# Patient Record
Sex: FEMALE | Race: OTHER | Employment: UNEMPLOYED | URBAN - METROPOLITAN AREA
[De-identification: names, ages, dates, MRNs, and addresses within clinical notes are randomized per-mention and may not be internally consistent; named-entity substitution may affect disease eponyms.]

---

## 2024-03-12 ENCOUNTER — APPOINTMENT (EMERGENCY)
Dept: CT IMAGING | Facility: HOSPITAL | Age: 29
End: 2024-03-12

## 2024-03-12 ENCOUNTER — HOSPITAL ENCOUNTER (EMERGENCY)
Facility: HOSPITAL | Age: 29
Discharge: HOME | End: 2024-03-12
Attending: EMERGENCY MEDICINE | Admitting: EMERGENCY MEDICINE

## 2024-03-12 VITALS
SYSTOLIC BLOOD PRESSURE: 109 MMHG | TEMPERATURE: 98.5 F | RESPIRATION RATE: 18 BRPM | DIASTOLIC BLOOD PRESSURE: 65 MMHG | HEART RATE: 95 BPM | HEIGHT: 63 IN | WEIGHT: 127.87 LBS | BODY MASS INDEX: 22.66 KG/M2 | OXYGEN SATURATION: 100 %

## 2024-03-12 DIAGNOSIS — H70.019: Primary | ICD-10-CM

## 2024-03-12 LAB
ALBUMIN SERPL BCP-MCNC: 4.4 G/DL (ref 3.5–5)
ALP SERPL-CCNC: 57 U/L (ref 34–104)
ALT SERPL W P-5'-P-CCNC: 9 U/L (ref 7–52)
ANION GAP SERPL CALCULATED.3IONS-SCNC: 6 MMOL/L (ref 4–13)
AST SERPL W P-5'-P-CCNC: 13 U/L (ref 13–39)
BASOPHILS # BLD AUTO: 0.04 THOUSANDS/ÂΜL (ref 0–0.1)
BASOPHILS NFR BLD AUTO: 0 % (ref 0–1)
BILIRUB SERPL-MCNC: 0.98 MG/DL (ref 0.2–1)
BUN SERPL-MCNC: 7 MG/DL (ref 5–25)
CALCIUM SERPL-MCNC: 8.9 MG/DL (ref 8.4–10.2)
CHLORIDE SERPL-SCNC: 105 MMOL/L (ref 96–108)
CO2 SERPL-SCNC: 26 MMOL/L (ref 21–32)
CREAT SERPL-MCNC: 0.51 MG/DL (ref 0.6–1.3)
EOSINOPHIL # BLD AUTO: 0.31 THOUSAND/ÂΜL (ref 0–0.61)
EOSINOPHIL NFR BLD AUTO: 3 % (ref 0–6)
ERYTHROCYTE [DISTWIDTH] IN BLOOD BY AUTOMATED COUNT: 12.3 % (ref 11.6–15.1)
EXT PREGNANCY TEST URINE: NEGATIVE
EXT. CONTROL: NORMAL
GFR SERPL CREATININE-BSD FRML MDRD: 131 ML/MIN/1.73SQ M
GLUCOSE SERPL-MCNC: 99 MG/DL (ref 65–140)
HCT VFR BLD AUTO: 38.2 % (ref 34.8–46.1)
HGB BLD-MCNC: 12.9 G/DL (ref 11.5–15.4)
IMM GRANULOCYTES # BLD AUTO: 0.03 THOUSAND/UL (ref 0–0.2)
IMM GRANULOCYTES NFR BLD AUTO: 0 % (ref 0–2)
LYMPHOCYTES # BLD AUTO: 1.59 THOUSANDS/ÂΜL (ref 0.6–4.47)
LYMPHOCYTES NFR BLD AUTO: 15 % (ref 14–44)
MCH RBC QN AUTO: 31.8 PG (ref 26.8–34.3)
MCHC RBC AUTO-ENTMCNC: 33.8 G/DL (ref 31.4–37.4)
MCV RBC AUTO: 94 FL (ref 82–98)
MONOCYTES # BLD AUTO: 0.97 THOUSAND/ÂΜL (ref 0.17–1.22)
MONOCYTES NFR BLD AUTO: 9 % (ref 4–12)
NEUTROPHILS # BLD AUTO: 7.9 THOUSANDS/ÂΜL (ref 1.85–7.62)
NEUTS SEG NFR BLD AUTO: 73 % (ref 43–75)
NRBC BLD AUTO-RTO: 0 /100 WBCS
PLATELET # BLD AUTO: 289 THOUSANDS/UL (ref 149–390)
PMV BLD AUTO: 9.3 FL (ref 8.9–12.7)
POTASSIUM SERPL-SCNC: 3.7 MMOL/L (ref 3.5–5.3)
PROT SERPL-MCNC: 7.4 G/DL (ref 6.4–8.4)
RBC # BLD AUTO: 4.06 MILLION/UL (ref 3.81–5.12)
SODIUM SERPL-SCNC: 137 MMOL/L (ref 135–147)
WBC # BLD AUTO: 10.84 THOUSAND/UL (ref 4.31–10.16)

## 2024-03-12 PROCEDURE — 96365 THER/PROPH/DIAG IV INF INIT: CPT

## 2024-03-12 PROCEDURE — 80053 COMPREHEN METABOLIC PANEL: CPT

## 2024-03-12 PROCEDURE — 70491 CT SOFT TISSUE NECK W/DYE: CPT

## 2024-03-12 PROCEDURE — 81025 URINE PREGNANCY TEST: CPT

## 2024-03-12 PROCEDURE — 96375 TX/PRO/DX INJ NEW DRUG ADDON: CPT

## 2024-03-12 PROCEDURE — 85025 COMPLETE CBC W/AUTO DIFF WBC: CPT

## 2024-03-12 PROCEDURE — 99285 EMERGENCY DEPT VISIT HI MDM: CPT

## 2024-03-12 PROCEDURE — 99283 EMERGENCY DEPT VISIT LOW MDM: CPT

## 2024-03-12 PROCEDURE — 36415 COLL VENOUS BLD VENIPUNCTURE: CPT

## 2024-03-12 RX ORDER — AMOXICILLIN AND CLAVULANATE POTASSIUM 875; 125 MG/1; MG/1
1 TABLET, FILM COATED ORAL ONCE
Status: DISCONTINUED | OUTPATIENT
Start: 2024-03-12 | End: 2024-03-12

## 2024-03-12 RX ORDER — MORPHINE SULFATE 4 MG/ML
4 INJECTION, SOLUTION INTRAMUSCULAR; INTRAVENOUS ONCE
Status: COMPLETED | OUTPATIENT
Start: 2024-03-12 | End: 2024-03-12

## 2024-03-12 RX ORDER — DEXAMETHASONE SODIUM PHOSPHATE 10 MG/ML
10 INJECTION, SOLUTION INTRAMUSCULAR; INTRAVENOUS ONCE
Status: COMPLETED | OUTPATIENT
Start: 2024-03-12 | End: 2024-03-12

## 2024-03-12 RX ORDER — CEFTRIAXONE 1 G/50ML
1000 INJECTION, SOLUTION INTRAVENOUS ONCE
Status: COMPLETED | OUTPATIENT
Start: 2024-03-12 | End: 2024-03-12

## 2024-03-12 RX ORDER — KETOROLAC TROMETHAMINE 10 MG/1
10 TABLET, FILM COATED ORAL EVERY 6 HOURS PRN
Qty: 14 TABLET | Refills: 0 | Status: SHIPPED | OUTPATIENT
Start: 2024-03-12

## 2024-03-12 RX ORDER — AMOXICILLIN AND CLAVULANATE POTASSIUM 875; 125 MG/1; MG/1
1 TABLET, FILM COATED ORAL EVERY 12 HOURS
Qty: 20 TABLET | Refills: 0 | Status: SHIPPED | OUTPATIENT
Start: 2024-03-12 | End: 2024-03-22

## 2024-03-12 RX ADMIN — IOHEXOL 85 ML: 350 INJECTION, SOLUTION INTRAVENOUS at 10:42

## 2024-03-12 RX ADMIN — MORPHINE SULFATE 4 MG: 4 INJECTION INTRAVENOUS at 10:10

## 2024-03-12 RX ADMIN — DEXAMETHASONE SODIUM PHOSPHATE 10 MG: 10 INJECTION INTRAMUSCULAR; INTRAVENOUS at 10:10

## 2024-03-12 RX ADMIN — CEFTRIAXONE 1000 MG: 1 INJECTION, SOLUTION INTRAVENOUS at 11:34

## 2024-03-12 NOTE — Clinical Note
Abdon Su was seen and treated in our emergency department on 3/12/2024.    No restrictions            Diagnosis:     Abdon  is off the rest of the shift today, may return to work on return date.    She may return on this date: 03/15/2024         If you have any questions or concerns, please don't hesitate to call.      Gee Limon PA-C    ______________________________           _______________          _______________  Hospital Representative                              Date                                Time

## 2024-03-12 NOTE — ED PROVIDER NOTES
History  Chief Complaint   Patient presents with    Oral Swelling     L sided lower jaw swelling starting x2 days ago     28-year-old female presents today with concerns of oral swelling and dental pain.  Patient states she is having difficulty opening her mouth which prompted her coming to the ER today.  No nausea or vomiting.  Patient able to handle secretions but able to open her mouth more than just a few centimeters.  Denies any fever or chills.  No abdominal pain.  No chest pain or shortness of breath.  Denies any ear pain patent.  Patient states that when she chews it hurts more.  States that any touching of the area hurts a lot more.  Has tried naproxen without any relief.        None       History reviewed. No pertinent past medical history.    History reviewed. No pertinent surgical history.    History reviewed. No pertinent family history.  I have reviewed and agree with the history as documented.    E-Cigarette/Vaping    E-Cigarette Use Never User      E-Cigarette/Vaping Substances     Social History     Tobacco Use    Smoking status: Never    Smokeless tobacco: Never   Vaping Use    Vaping status: Never Used   Substance Use Topics    Alcohol use: Never    Drug use: Never       Review of Systems   Constitutional:  Negative for chills and fever.   HENT:  Positive for dental problem and facial swelling. Negative for drooling, ear discharge, ear pain, sore throat, trouble swallowing and voice change.    Eyes:  Negative for pain and visual disturbance.   Respiratory:  Negative for cough and shortness of breath.    Cardiovascular:  Negative for chest pain and palpitations.   Gastrointestinal:  Negative for abdominal pain, diarrhea, nausea and vomiting.   Genitourinary:  Negative for dysuria and hematuria.   Musculoskeletal:  Negative for arthralgias and back pain.   Skin:  Negative for color change and rash.   Neurological:  Negative for seizures and syncope.   All other systems reviewed and are  negative.      Physical Exam  Physical Exam  Vitals and nursing note reviewed.   Constitutional:       General: She is not in acute distress.     Appearance: She is well-developed.   HENT:      Head: Normocephalic and atraumatic.      Mouth/Throat:      Mouth: Mucous membranes are moist.      Pharynx: Posterior oropharyngeal erythema (Mild, left-sided.  No obvious dental caries.  No discharge in the mouth.) present. No oropharyngeal exudate.      Comments: Tenderness to the left jaw.  Elevation of the tongue.  Trismus.  Handling secretions at this time.  Eyes:      Conjunctiva/sclera: Conjunctivae normal.   Cardiovascular:      Rate and Rhythm: Normal rate and regular rhythm.      Pulses: Normal pulses.      Heart sounds: Normal heart sounds. No murmur heard.     No friction rub. No gallop.   Pulmonary:      Effort: Pulmonary effort is normal. No respiratory distress.      Breath sounds: Normal breath sounds.   Abdominal:      Palpations: Abdomen is soft.      Tenderness: There is no abdominal tenderness.   Musculoskeletal:         General: No swelling.      Cervical back: Neck supple. Tenderness present. No rigidity.   Lymphadenopathy:      Cervical: Cervical adenopathy (Mild, tender.) present.   Skin:     General: Skin is warm and dry.      Capillary Refill: Capillary refill takes less than 2 seconds.   Neurological:      Mental Status: She is alert.   Psychiatric:         Mood and Affect: Mood normal.         Vital Signs  ED Triage Vitals   Temperature Pulse Respirations Blood Pressure SpO2   03/12/24 0950 03/12/24 0950 03/12/24 0950 03/12/24 0950 03/12/24 0950   98.5 °F (36.9 °C) 95 18 109/65 100 %      Temp Source Heart Rate Source Patient Position - Orthostatic VS BP Location FiO2 (%)   03/12/24 0950 03/12/24 0950 03/12/24 0950 03/12/24 0950 --   Oral Monitor Sitting Left arm       Pain Score       03/12/24 0948       8           Vitals:    03/12/24 0950   BP: 109/65   Pulse: 95   Patient Position -  Orthostatic VS: Sitting         Visual Acuity      ED Medications  Medications   dexamethasone (PF) (DECADRON) injection 10 mg (10 mg Intravenous Given 3/12/24 1010)   morphine injection 4 mg (4 mg Intravenous Given 3/12/24 1010)   iohexol (OMNIPAQUE) 350 MG/ML injection (SINGLE-DOSE) 85 mL (85 mL Intravenous Given 3/12/24 1042)   cefTRIAXone (ROCEPHIN) IVPB (premix in dextrose) 1,000 mg 50 mL (0 mg Intravenous Stopped 3/12/24 1208)       Diagnostic Studies  Results Reviewed       Procedure Component Value Units Date/Time    Comprehensive metabolic panel [[id number]]  (Abnormal) Collected: 03/12/24 1005    Lab Status: Final result Specimen: Blood from Arm, Right Updated: 03/12/24 1029     Sodium 137 mmol/L      Potassium 3.7 mmol/L      Chloride 105 mmol/L      CO2 26 mmol/L      ANION GAP 6 mmol/L      BUN 7 mg/dL      Creatinine 0.51 mg/dL      Glucose 99 mg/dL      Calcium 8.9 mg/dL      AST 13 U/L      ALT 9 U/L      Alkaline Phosphatase 57 U/L      Total Protein 7.4 g/dL      Albumin 4.4 g/dL      Total Bilirubin 0.98 mg/dL      eGFR 131 ml/min/1.73sq m     Narrative:      National Kid[employer] Foundation guidelines for Chronic Kidney Disease (CKD):     Stage 1 with normal or high GFR (GFR > 90 mL/min/1.73 square meters)    Stage 2 Mild CKD (GFR = 60-89 mL/min/1.73 square meters)    Stage 3A Moderate CKD (GFR = 45-59 mL/min/1.73 square meters)    Stage 3B Moderate CKD (GFR = 30-44 mL/min/1.73 square meters)    Stage 4 Severe CKD (GFR = 15-29 mL/min/1.73 square meters)    Stage 5 End Stage CKD (GFR <15 mL/min/1.73 square meters)  Note: GFR calculation is accurate only with a steady state creatinine    CBC and differential [[id number]]  (Abnormal) Collected: 03/12/24 1005    Lab Status: Final result Specimen: Blood from Arm, Right Updated: 03/12/24 1010     WBC 10.84 Thousand/uL      RBC 4.06 Million/uL      Hemoglobin 12.9 g/dL      Hematocrit 38.2 %      MCV 94 fL      MCH 31.8 pg      MCHC 33.8 g/dL      RDW  12.3 %      MPV 9.3 fL      Platelets 289 Thousands/uL      nRBC 0 /100 WBCs      Neutrophils Relative 73 %      Immature Grans % 0 %      Lymphocytes Relative 15 %      Monocytes Relative 9 %      Eosinophils Relative 3 %      Basophils Relative 0 %      Neutrophils Absolute 7.90 Thousands/µL      Absolute Immature Grans 0.03 Thousand/uL      Absolute Lymphocytes 1.59 Thousands/µL      Absolute Monocytes 0.97 Thousand/µL      Eosinophils Absolute 0.31 Thousand/µL      Basophils Absolute 0.04 Thousands/µL     POCT pregnancy, urine [[id number]]  (Normal) Resulted: 03/12/24 1009    Lab Status: Final result Updated: 03/12/24 1009     EXT Preg Test, Ur Negative     Control Valid                   CT soft tissue neck with contrast   Final Result by Yasir Gallardo MD (03/12 1055)      Diffuse inflammatory/edematous change in left perimandibular region, left sublingual space, left submandibular space, inferior extension into left lower anterolateral neck, and submucosal edema and left oropharynx with effacement of left vallecula    suspicious for acute cellulitis in deep neck spaces with reactive sialadenitis of left submandibular gland. 0.7 cm subperiosteal abscess along the inner cortex of left angle of mandible. Odontogenic source of infection is likely impacted mandibular last    molar tooth given periapical lucency. Recommend dental consultation for further evaluation.      Mildly prominent subcentimeter left cervical lymph nodes, likely reactive.      C1-C2 posterior spinal fixation hardware. No acute hardware complication.      The study was marked in EPIC for immediate notification.                           Workstation performed: [id number]                    Procedures  Procedures         ED Course  ED Course as of 03/12/24 1357   Tue Mar 12, 2024   1010 WBC(!): 10.84   1011 PREGNANCY TEST URINE: Negative                            Initial Sepsis Screening       Row Name 03/12/24 1033                Is the  patient's history suggestive of a new or worsening infection? Yes (Proceed)  -ST        Suspected source of infection soft tissue  -ST        Indicate SIRS criteria Tachycardia > 90 bpm  -ST        Are two or more of the above signs & symptoms of infection both present and new to the patient? No  -ST                  User Key  (r) = Recorded By, (t) = Taken By, (c) = Cosigned By      Initials Name Provider Type    ST Gee Limon PA-C Physician Assistant                    SBIRT 20yo+      Flowsheet Row Most Recent Value   Initial Alcohol Screen: US AUDIT-C     1. How often do you have a drink containing alcohol? 0 Filed at: 03/12/2024 1044   2. How many drinks containing alcohol do you have on a typical day you are drinking?  0 Filed at: 03/12/2024 1044   3b. FEMALE Any Age, or MALE 65+: How often do you have 4 or more drinks on one occassion? 0 Filed at: 03/12/2024 1044   Audit-C Score 0 Filed at: 03/12/2024 1044   MISSY: How many times in the past year have you...    Used an illegal drug or used a prescription medication for non-medical reasons? Never Filed at: 03/12/2024 1044                      Medical Decision Making  20-year-old female presents today with concerns of pain when opening mouth, jaw pain, tooth pain.  No nausea or vomiting.  Trismus on exam, tenderness to palpation along the left jaw and left neck.  CT soft tissue neck to rule out Troy's angina vs deep abscess. Patient handling secretions this time.  CBC, CMP, POCT pregnancy.  Decadron for swelling.  Lab workup with mild leukocytosis, otherwise reassuring.  Patient feeling better after medications.  CT consistent with subcentimeter abscess, left subperiosteal with diffuse cellulitis and edema.  Reached out to [facility], will be able to see in the office soon for possible drainage. Per [facility], will give dose of IV abx while in the ER and dishcarge on augmentin and toradol for infection and pain/swelling, respectively. Referral  "sent.  ------------------------------------------------------------  Strict return precautions discussed. Patient at time of discharge well-appearing in no acute distress, all questions answered. Patient agreeable to plan.  Patient's vitals, lab/imaging results, diagnosis, and treatment plan were discussed with the patient. All new/changed medications were discussed with patient, specifically, route of administration, how often and when to take, and where they can be picked up. Strict return precautions as well as close follow up with PCP was discussed with the patient and the patient was agreeable to my recommendations.  Patient verbally acknowledged understanding of the above communications. All labs reviewed and utilized in the medical decision making process (if labs were ordered). Portions of the record may have been created with voice recognition software.  Occasional wrong word or \"sound a like\" substitutions may have occurred due to the inherent limitations of voice recognition software.  Read the chart carefully and recognize, using context, where substitutions have occurred.      Amount and/or Complexity of Data Reviewed  Labs: ordered. Decision-making details documented in ED Course.  Radiology: ordered.    Risk  Prescription drug management.             Disposition  Final diagnoses:   Subperiosteal abscess of mastoid     Time reflects when diagnosis was documented in both MDM as applicable and the Disposition within this note       Time User Action Codes Description Comment    3/12/2024 11:07 AM Gee Limon Add [H70.019] Subperiosteal abscess of mastoid           ED Disposition       ED Disposition   Discharge    Condition   Stable    Date/Time   Tue Mar 12, 2024 1106    Comment   Abdon Su discharge to home/self care.                   Follow-up Information       Follow up With Specialties Details Why Contact Info Additional Information    [facility] Emergency Department " Emergency Medicine Go to  If symptoms worsen [address] [address]  [phone] [facility], [address] [address]    Kamla Chavez DMD Oral Maxillofacial Surgery Schedule an appointment as soon as possible for a visit   [address] [place] [address] [id number] [phone]               Discharge Medication List as of 3/12/2024 11:36 AM        START taking these medications    Details   amoxicillin-clavulanate (AUGMENTIN) 875-125 mg per tablet Take 1 tablet by mouth every 12 (twelve) hours for 10 days, Starting Tue 3/12/2024, Until Fri 3/22/2024, Normal      ketorolac (TORADOL) 10 mg tablet Take 1 tablet (10 mg total) by mouth every 6 (six) hours as needed for moderate pain, Starting Tue 3/12/2024, Normal                 PDMP Review       None            ED Provider  Electronically Signed by             Gee Limon PA-C  03/12/24 8876

## 2024-05-12 ENCOUNTER — APPOINTMENT (EMERGENCY)
Dept: CT IMAGING | Facility: HOSPITAL | Age: 29
End: 2024-05-12

## 2024-05-12 ENCOUNTER — HOSPITAL ENCOUNTER (EMERGENCY)
Facility: HOSPITAL | Age: 29
Discharge: STILL ADMITTED OR ADMINISTRATIVE | End: 2024-05-12
Attending: INTERNAL MEDICINE

## 2024-05-12 ENCOUNTER — HOSPITAL ENCOUNTER (INPATIENT)
Facility: HOSPITAL | Age: 29
LOS: 4 days | Discharge: HOME | End: 2024-05-16
Attending: INTERNAL MEDICINE | Admitting: INTERNAL MEDICINE
Payer: COMMERCIAL

## 2024-05-12 VITALS
SYSTOLIC BLOOD PRESSURE: 99 MMHG | TEMPERATURE: 98.3 F | RESPIRATION RATE: 16 BRPM | OXYGEN SATURATION: 99 % | DIASTOLIC BLOOD PRESSURE: 57 MMHG | HEART RATE: 82 BPM

## 2024-05-12 DIAGNOSIS — K04.7 ACUTE PERIAPICAL ABSCESS: Primary | ICD-10-CM

## 2024-05-12 DIAGNOSIS — K04.7 DENTAL ABSCESS: Primary | ICD-10-CM

## 2024-05-12 DIAGNOSIS — M27.2 MANDIBULAR ABSCESS: ICD-10-CM

## 2024-05-12 LAB
ANION GAP SERPL CALCULATED.3IONS-SCNC: 6 MMOL/L (ref 4–13)
BASOPHILS # BLD AUTO: 0.05 THOUSANDS/ÂΜL (ref 0–0.1)
BASOPHILS NFR BLD AUTO: 1 % (ref 0–1)
BUN SERPL-MCNC: 11 MG/DL (ref 5–25)
CALCIUM SERPL-MCNC: 8.7 MG/DL (ref 8.4–10.2)
CHLORIDE SERPL-SCNC: 104 MMOL/L (ref 96–108)
CO2 SERPL-SCNC: 26 MMOL/L (ref 21–32)
CREAT SERPL-MCNC: 0.5 MG/DL (ref 0.6–1.3)
EOSINOPHIL # BLD AUTO: 0.44 THOUSAND/ÂΜL (ref 0–0.61)
EOSINOPHIL NFR BLD AUTO: 4 % (ref 0–6)
ERYTHROCYTE [DISTWIDTH] IN BLOOD BY AUTOMATED COUNT: 12.2 % (ref 11.6–15.1)
EXT PREGNANCY TEST URINE: NEGATIVE
EXT. CONTROL: NORMAL
GFR SERPL CREATININE-BSD FRML MDRD: 132 ML/MIN/1.73SQ M
GLUCOSE SERPL-MCNC: 87 MG/DL (ref 65–140)
HCT VFR BLD AUTO: 34 % (ref 34.8–46.1)
HGB BLD-MCNC: 11.7 G/DL (ref 11.5–15.4)
IMM GRANULOCYTES # BLD AUTO: 0.03 THOUSAND/UL (ref 0–0.2)
IMM GRANULOCYTES NFR BLD AUTO: 0 % (ref 0–2)
LYMPHOCYTES # BLD AUTO: 1.87 THOUSANDS/ÂΜL (ref 0.6–4.47)
LYMPHOCYTES NFR BLD AUTO: 17 % (ref 14–44)
MCH RBC QN AUTO: 32.7 PG (ref 26.8–34.3)
MCHC RBC AUTO-ENTMCNC: 34.4 G/DL (ref 31.4–37.4)
MCV RBC AUTO: 95 FL (ref 82–98)
MONOCYTES # BLD AUTO: 0.87 THOUSAND/ÂΜL (ref 0.17–1.22)
MONOCYTES NFR BLD AUTO: 8 % (ref 4–12)
NEUTROPHILS # BLD AUTO: 7.52 THOUSANDS/ÂΜL (ref 1.85–7.62)
NEUTS SEG NFR BLD AUTO: 70 % (ref 43–75)
NRBC BLD AUTO-RTO: 0 /100 WBCS
PLATELET # BLD AUTO: 312 THOUSANDS/UL (ref 149–390)
PMV BLD AUTO: 9.6 FL (ref 8.9–12.7)
POTASSIUM SERPL-SCNC: 3.9 MMOL/L (ref 3.5–5.3)
RBC # BLD AUTO: 3.58 MILLION/UL (ref 3.81–5.12)
SODIUM SERPL-SCNC: 136 MMOL/L (ref 135–147)
WBC # BLD AUTO: 10.78 THOUSAND/UL (ref 4.31–10.16)

## 2024-05-12 PROCEDURE — 36415 COLL VENOUS BLD VENIPUNCTURE: CPT | Performed by: INTERNAL MEDICINE

## 2024-05-12 PROCEDURE — 81025 URINE PREGNANCY TEST: CPT | Performed by: INTERNAL MEDICINE

## 2024-05-12 PROCEDURE — 96375 TX/PRO/DX INJ NEW DRUG ADDON: CPT

## 2024-05-12 PROCEDURE — 99223 1ST HOSP IP/OBS HIGH 75: CPT | Performed by: INTERNAL MEDICINE

## 2024-05-12 PROCEDURE — 85025 COMPLETE CBC W/AUTO DIFF WBC: CPT | Performed by: INTERNAL MEDICINE

## 2024-05-12 PROCEDURE — 96361 HYDRATE IV INFUSION ADD-ON: CPT

## 2024-05-12 PROCEDURE — 70491 CT SOFT TISSUE NECK W/DYE: CPT

## 2024-05-12 PROCEDURE — 80048 BASIC METABOLIC PNL TOTAL CA: CPT | Performed by: INTERNAL MEDICINE

## 2024-05-12 PROCEDURE — 99285 EMERGENCY DEPT VISIT HI MDM: CPT | Performed by: INTERNAL MEDICINE

## 2024-05-12 PROCEDURE — 96376 TX/PRO/DX INJ SAME DRUG ADON: CPT

## 2024-05-12 PROCEDURE — 96365 THER/PROPH/DIAG IV INF INIT: CPT

## 2024-05-12 PROCEDURE — 99284 EMERGENCY DEPT VISIT MOD MDM: CPT

## 2024-05-12 RX ORDER — KETOROLAC TROMETHAMINE 30 MG/ML
15 INJECTION, SOLUTION INTRAMUSCULAR; INTRAVENOUS ONCE
Status: COMPLETED | OUTPATIENT
Start: 2024-05-12 | End: 2024-05-12

## 2024-05-12 RX ORDER — ACETAMINOPHEN 10 MG/ML
1000 INJECTION, SOLUTION INTRAVENOUS ONCE
Status: COMPLETED | OUTPATIENT
Start: 2024-05-12 | End: 2024-05-12

## 2024-05-12 RX ORDER — NAPROXEN 500 MG/1
500 TABLET ORAL 2 TIMES DAILY WITH MEALS
COMMUNITY
End: 2024-05-16

## 2024-05-12 RX ORDER — HYDROMORPHONE HCL IN WATER/PF 6 MG/30 ML
0.2 PATIENT CONTROLLED ANALGESIA SYRINGE INTRAVENOUS EVERY 2 HOUR PRN
Status: DISCONTINUED | OUTPATIENT
Start: 2024-05-12 | End: 2024-05-13

## 2024-05-12 RX ORDER — ACETAMINOPHEN 325 MG/1
975 TABLET ORAL EVERY 6 HOURS PRN
Status: DISCONTINUED | OUTPATIENT
Start: 2024-05-12 | End: 2024-05-12

## 2024-05-12 RX ORDER — MORPHINE SULFATE 4 MG/ML
4 INJECTION, SOLUTION INTRAMUSCULAR; INTRAVENOUS ONCE
Status: COMPLETED | OUTPATIENT
Start: 2024-05-12 | End: 2024-05-12

## 2024-05-12 RX ORDER — ACETAMINOPHEN 10 MG/ML
1000 INJECTION, SOLUTION INTRAVENOUS EVERY 6 HOURS PRN
Status: DISCONTINUED | OUTPATIENT
Start: 2024-05-12 | End: 2024-05-13

## 2024-05-12 RX ORDER — OXYCODONE HYDROCHLORIDE 5 MG/1
5 TABLET ORAL EVERY 4 HOURS PRN
Status: DISCONTINUED | OUTPATIENT
Start: 2024-05-12 | End: 2024-05-12

## 2024-05-12 RX ORDER — SODIUM CHLORIDE, SODIUM GLUCONATE, SODIUM ACETATE, POTASSIUM CHLORIDE, MAGNESIUM CHLORIDE, SODIUM PHOSPHATE, DIBASIC, AND POTASSIUM PHOSPHATE .53; .5; .37; .037; .03; .012; .00082 G/100ML; G/100ML; G/100ML; G/100ML; G/100ML; G/100ML; G/100ML
100 INJECTION, SOLUTION INTRAVENOUS CONTINUOUS
Status: DISCONTINUED | OUTPATIENT
Start: 2024-05-12 | End: 2024-05-16 | Stop reason: HOSPADM

## 2024-05-12 RX ORDER — ONDANSETRON 2 MG/ML
4 INJECTION INTRAMUSCULAR; INTRAVENOUS EVERY 6 HOURS PRN
Status: DISCONTINUED | OUTPATIENT
Start: 2024-05-12 | End: 2024-05-12

## 2024-05-12 RX ORDER — SENNOSIDES 8.6 MG
1 TABLET ORAL DAILY
Status: DISCONTINUED | OUTPATIENT
Start: 2024-05-13 | End: 2024-05-16 | Stop reason: HOSPADM

## 2024-05-12 RX ORDER — ONDANSETRON 2 MG/ML
4 INJECTION INTRAMUSCULAR; INTRAVENOUS EVERY 6 HOURS PRN
Status: DISCONTINUED | OUTPATIENT
Start: 2024-05-12 | End: 2024-05-16 | Stop reason: HOSPADM

## 2024-05-12 RX ADMIN — AMPICILLIN SODIUM AND SULBACTAM SODIUM 3 G: 2; 1 INJECTION, POWDER, FOR SOLUTION INTRAMUSCULAR; INTRAVENOUS at 12:20

## 2024-05-12 RX ADMIN — HYDROMORPHONE HYDROCHLORIDE 0.2 MG: 0.2 INJECTION, SOLUTION INTRAMUSCULAR; INTRAVENOUS; SUBCUTANEOUS at 19:38

## 2024-05-12 RX ADMIN — ONDANSETRON 4 MG: 2 INJECTION INTRAMUSCULAR; INTRAVENOUS at 20:14

## 2024-05-12 RX ADMIN — IOHEXOL 100 ML: 350 INJECTION, SOLUTION INTRAVENOUS at 10:57

## 2024-05-12 RX ADMIN — KETOROLAC TROMETHAMINE 15 MG: 30 INJECTION, SOLUTION INTRAMUSCULAR at 10:21

## 2024-05-12 RX ADMIN — MORPHINE SULFATE 4 MG: 4 INJECTION INTRAVENOUS at 15:38

## 2024-05-12 RX ADMIN — SODIUM CHLORIDE 3 G: 9 INJECTION, SOLUTION INTRAVENOUS at 23:52

## 2024-05-12 RX ADMIN — KETOROLAC TROMETHAMINE 15 MG: 30 INJECTION, SOLUTION INTRAMUSCULAR at 11:31

## 2024-05-12 RX ADMIN — SODIUM CHLORIDE, SODIUM GLUCONATE, SODIUM ACETATE, POTASSIUM CHLORIDE, MAGNESIUM CHLORIDE, SODIUM PHOSPHATE, DIBASIC, AND POTASSIUM PHOSPHATE 100 ML/HR: .53; .5; .37; .037; .03; .012; .00082 INJECTION, SOLUTION INTRAVENOUS at 20:15

## 2024-05-12 RX ADMIN — ACETAMINOPHEN 1000 MG: 10 INJECTION INTRAVENOUS at 21:43

## 2024-05-12 RX ADMIN — SODIUM CHLORIDE 1000 ML: 0.9 INJECTION, SOLUTION INTRAVENOUS at 10:16

## 2024-05-12 NOTE — ED PROVIDER NOTES
History  Chief Complaint   Patient presents with    Dental Pain     Pt has had dental pain for multiple days, states her wisdom teeth on L lower side hurts. Pt does have some L facial swelling over jaw region noted. Pt has been taking naproxen without relief, did not take meds today. Denies fevers.     This is a 28 years old came for having pain at the left mandibular area.  Patient is stated that she has dental pain.  Patient has no fever.  Patient is unable to open her mouth.  patient cannot chew, and she used to take fluids.  Patient has no facial swelling.  Patient denies fever.  Patient has the pain at this is the mandibular area for 2 days and there is area of swelling which is very tender.  Patient keep taking naproxen with no help.  Patient denies any headache.  Patient denies sore throat.        Prior to Admission Medications   Prescriptions Last Dose Informant Patient Reported? Taking?   ketorolac (TORADOL) 10 mg tablet   No No   Sig: Take 1 tablet (10 mg total) by mouth every 6 (six) hours as needed for moderate pain      Facility-Administered Medications: None       History reviewed. No pertinent past medical history.    History reviewed. No pertinent surgical history.    History reviewed. No pertinent family history.  I have reviewed and agree with the history as documented.    E-Cigarette/Vaping    E-Cigarette Use Never User      E-Cigarette/Vaping Substances     Social History     Tobacco Use    Smoking status: Never    Smokeless tobacco: Never   Vaping Use    Vaping status: Never Used   Substance Use Topics    Alcohol use: Never    Drug use: Never       Review of Systems   Constitutional:  Negative for fatigue and fever.   HENT:  Positive for dental problem. Negative for congestion, drooling, ear discharge, ear pain, facial swelling, mouth sores, nosebleeds, postnasal drip, rhinorrhea, sinus pressure, sinus pain, sneezing, sore throat, tinnitus and trouble swallowing.    Respiratory:  Negative for  cough and shortness of breath.    Cardiovascular:  Negative for chest pain and palpitations.   Gastrointestinal:  Negative for abdominal pain, diarrhea, nausea and vomiting.   Genitourinary:  Negative for difficulty urinating, dysuria, flank pain and frequency.   Musculoskeletal:  Negative for back pain, myalgias, neck pain and neck stiffness.   Skin:  Negative for color change, pallor and rash.   Neurological:  Negative for dizziness, light-headedness and headaches.   Psychiatric/Behavioral:  Negative for agitation and behavioral problems.        Physical Exam  Physical Exam  Vitals and nursing note reviewed.   Constitutional:       General: She is not in acute distress.     Appearance: She is well-developed. She is not ill-appearing, toxic-appearing or diaphoretic.   HENT:      Head: Normocephalic and atraumatic.        Right Ear: Tympanic membrane, ear canal and external ear normal. There is no impacted cerumen.      Left Ear: Tympanic membrane, ear canal and external ear normal.      Nose: Nose normal. No congestion or rhinorrhea.      Mouth/Throat:      Mouth: Mucous membranes are moist.      Dentition: Normal dentition. Does not have dentures. No dental tenderness, gingival swelling, dental caries or gum lesions.      Tongue: No lesions. Tongue does not deviate from midline.      Palate: No mass and lesions.      Pharynx: No oropharyngeal exudate or posterior oropharyngeal erythema.      Comments: At the left mandibular area there is an area of swelling which is 1 x 1 inches the area is very tender to touch.  The swelling is firm in consistency not mobile.  Patient cannot open her mouth to fully evaluating gait.  Patient has no tenderness on the teeth.  Patient has no swollen gums.  There is no cervical lymphadenopathy.  Cardiovascular:      Rate and Rhythm: Normal rate and regular rhythm.      Heart sounds: Normal heart sounds. No murmur heard.     No friction rub. No gallop.   Pulmonary:      Effort:  Pulmonary effort is normal. No respiratory distress.      Breath sounds: Normal breath sounds. No wheezing.   Abdominal:      General: Bowel sounds are normal.      Palpations: Abdomen is soft.   Musculoskeletal:         General: No tenderness or deformity. Normal range of motion.      Cervical back: Normal range of motion and neck supple.   Skin:     General: Skin is warm and dry.   Neurological:      Mental Status: She is alert and oriented to person, place, and time.   Psychiatric:         Behavior: Behavior normal.         Vital Signs  ED Triage Vitals [05/12/24 0952]   Temperature Pulse Respirations Blood Pressure SpO2   97.7 °F (36.5 °C) 79 16 134/80 97 %      Temp Source Heart Rate Source Patient Position - Orthostatic VS BP Location FiO2 (%)   Axillary Monitor Sitting Left arm --      Pain Score       5           Vitals:    05/12/24 1125 05/12/24 1130 05/12/24 1145 05/12/24 1535   BP: 105/51 91/54 95/51 99/57   Pulse: 79 75 74 82   Patient Position - Orthostatic VS: Lying Lying  Lying         Visual Acuity      ED Medications  Medications   sodium chloride 0.9 % bolus 1,000 mL (0 mL Intravenous Stopped 5/12/24 1114)   ketorolac (TORADOL) injection 15 mg (15 mg Intravenous Given 5/12/24 1021)   iohexol (OMNIPAQUE) 350 MG/ML injection (SINGLE-DOSE) 100 mL (100 mL Intravenous Given 5/12/24 1057)   ketorolac (TORADOL) injection 15 mg (15 mg Intravenous Given 5/12/24 1131)   ampicillin-sulbactam (UNASYN) 3 g in sodium chloride 0.9 % 100 mL IVPB (0 g Intravenous Stopped 5/12/24 1255)   morphine injection 4 mg (4 mg Intravenous Given 5/12/24 1538)       Diagnostic Studies  Results Reviewed       Procedure Component Value Units Date/Time    Basic metabolic panel [[id number]]  (Abnormal) Collected: 05/12/24 1016    Lab Status: Final result Specimen: Blood from Arm, Right Updated: 05/12/24 1038     Sodium 136 mmol/L      Potassium 3.9 mmol/L      Chloride 104 mmol/L      CO2 26 mmol/L      ANION GAP 6 mmol/L      BUN  11 mg/dL      Creatinine 0.50 mg/dL      Glucose 87 mg/dL      Calcium 8.7 mg/dL      eGFR 132 ml/min/1.73sq m     Narrative:      National Kid[employer] Foundation guidelines for Chronic Kidney Disease (CKD):     Stage 1 with normal or high GFR (GFR > 90 mL/min/1.73 square meters)    Stage 2 Mild CKD (GFR = 60-89 mL/min/1.73 square meters)    Stage 3A Moderate CKD (GFR = 45-59 mL/min/1.73 square meters)    Stage 3B Moderate CKD (GFR = 30-44 mL/min/1.73 square meters)    Stage 4 Severe CKD (GFR = 15-29 mL/min/1.73 square meters)    Stage 5 End Stage CKD (GFR <15 mL/min/1.73 square meters)  Note: GFR calculation is accurate only with a steady state creatinine    POCT pregnancy, urine [[id number]]  (Normal) Resulted: 05/12/24 1026    Lab Status: Final result Updated: 05/12/24 1026     EXT Preg Test, Ur Negative     Control Valid    CBC and differential [[id number]]  (Abnormal) Collected: 05/12/24 1016    Lab Status: Final result Specimen: Blood from Arm, Right Updated: 05/12/24 1025     WBC 10.78 Thousand/uL      RBC 3.58 Million/uL      Hemoglobin 11.7 g/dL      Hematocrit 34.0 %      MCV 95 fL      MCH 32.7 pg      MCHC 34.4 g/dL      RDW 12.2 %      MPV 9.6 fL      Platelets 312 Thousands/uL      nRBC 0 /100 WBCs      Segmented % 70 %      Immature Grans % 0 %      Lymphocytes % 17 %      Monocytes % 8 %      Eosinophils Relative 4 %      Basophils Relative 1 %      Absolute Neutrophils 7.52 Thousands/µL      Absolute Immature Grans 0.03 Thousand/uL      Absolute Lymphocytes 1.87 Thousands/µL      Absolute Monocytes 0.87 Thousand/µL      Eosinophils Absolute 0.44 Thousand/µL      Basophils Absolute 0.05 Thousands/µL                    CT soft tissue neck with contrast   Final Result by Julito Mccollum MD (05/12 1121)      Redemonstration of periodontal disease and periapical lucency involving the left third mandibular molar, with new breach of the lingual cortex since the prior examination and consistent with  infection/periapical abscess.      Inflammatory change involving the  space with ill-defined low-attenuation marginal enhancement along the lingual aspect of the posterior left mandible is consistent with phlegmon/developing abscess formation. Edema extends into the left    submandibular and sublingual spaces along the floor of mouth.      Periapical lucency surrounding the left second mandibular molar is worsened and suspicious for worsening periapical abscess.         Workstation performed: [id number]                    Procedures  Procedures         ED Course                               SBIRT 20yo+      Flowsheet Row Most Recent Value   Initial Alcohol Screen: US AUDIT-C     1. How often do you have a drink containing alcohol? 0 Filed at: 05/12/2024 1200   2. How many drinks containing alcohol do you have on a typical day you are drinking?  0 Filed at: 05/12/2024 1200   3b. FEMALE Any Age, or MALE 65+: How often do you have 4 or more drinks on one occassion? 0 Filed at: 05/12/2024 1200   Audit-C Score 0 Filed at: 05/12/2024 1200   MISSY: How many times in the past year have you...    Used an illegal drug or used a prescription medication for non-medical reasons? Never Filed at: 05/12/2024 1200                      Medical Decision Making  This is a 28 years old came for having pain at the left mandibular area.  Patient has swelling and tenderness for 2 days.  Patient has no fever.  Patient has no facial swelling.  Patient has no cervical lymphadenopathy.    Physical exam is pertinent for ; At the left mandibular area there is an area of swelling which is 1 x 1 inches the area is very tender to touch.  The swelling is firm in consistency not mobile.  Patient cannot open her mouth to fully evaluating gait.  Patient has no tenderness on the teeth.  Patient has no swollen gums.  There is no cervical lymphadenopathy.  WBC 10.7. Ct neck soft tissue ;  Redemonstration of periodontal disease and periapical  lucency involving the left third mandibular molar, with new breach of the lingual cortex since the prior examination and consistent with infection/periapical abscess.   Inflammatory change involving the  space with ill-defined low-attenuation marginal enhancement along the lingual aspect of the posterior left mandible is consistent with phlegmon/developing abscess formation. Edema extends into the left   submandibular and sublingual spaces along the floor of mouth.   Periapical lucency surrounding the left second mandibular molar is worsened and suspicious for worsening periapical abscess.  Case discussed with OMS Dr Awadallah and stated;    She should transferred to [place] or [facility] but pref [facility] for inpt admission for iv abx and surgical management. Under family medicine or [facility]. Npo at midnight.  Pt accepted to [place] and admitted to [facility] .      Amount and/or Complexity of Data Reviewed  Labs: ordered.     Details: CBC, CMP are WNL        Radiology: ordered.     Details: Ct soft tissue neck; CT soft tissue neck with contrast  Final Result by Julito Mccollum MD (05/12 1121)    Redemonstration of periodontal disease and periapical lucency involving the left third mandibular molar, with new breach of the lingual cortex since the prior examination and consistent with infection/periapical abscess.    Inflammatory change involving the  space with ill-defined low-attenuation marginal enhancement along the lingual aspect of the posterior left mandible is consistent with phlegmon/developing abscess formation. Edema extends into the left   submandibular and sublingual spaces along the floor of mouth.    Periapical lucency surrounding the left second mandibular molar is worsened and suspicious for worsening periapical abscess.      Workstation performed: [id number]      Discussion of management or test interpretation with external provider(s): Case discussed with OMS Dr Awadallah and stated;    She should  transferred to [place] or [facility] but pref [facility] for inpt admission for iv abx and surgical management. Under family medicine or [facility]. Npo at midnight        Risk  Prescription drug management.             Disposition  Final diagnoses:   Acute periapical abscess   Mandibular abscess     Time reflects when diagnosis was documented in both MDM as applicable and the Disposition within this note       Time User Action Codes Description Comment    5/12/2024 12:09 PM Marisel Baugh [K04.7] Acute periapical abscess     5/12/2024 12:37 PM Marisel Baugh [M27.2] Mandibular abscess           ED Disposition       ED Disposition   Transfer to Another Facility-In Network    Condition   --    Date/Time   Sun May 12, 2024 1237    Comment   Abdon Su should be transferred out to  [place] [facility] Documentation      Flowsheet Row Most Recent Value   Patient Condition The patient has been stabilized such that within reasonable medical probability, no material deterioration of the patient condition or the condition of the unborn child(maida) is likely to result from the transfer   Reason for Transfer Level of Care needed not available at this facility   Benefits of Transfer Specialized equipment and/or services available at the receiving facility (Include comment)________________________   Risks of Transfer Potential for delay in receiving treatment, Potential deterioration of medical condition, Loss of IV, Increased discomfort during transfer, Possible worsening of condition or death during transfer   Accepting Physician TATIANA (Devonte Zheng)   Accepting Facility Name, City & State  [place]   Sending MD Baugh   Provider Certification General risk, such as traffic hazards, adverse weather conditions, rough terrain or turbulence, possible failure of equipment (including vehicle or aircraft), or consequences of actions of persons outside the control of the transport personnel, Unanticipated  needs of medical equipment and personnel during transport, Risk of worsening condition, The possibility of a transport vehicle being unavailable          RN Documentation      Flowsheet Row Most Recent Value   Accepting Facility Name, City & State  [place]          Follow-up Information    None         Patient's Medications   Discharge Prescriptions    No medications on file       No discharge procedures on file.    PDMP Review       None            ED Provider  Electronically Signed by             Marisel Baugh MD  05/12/24 6182

## 2024-05-12 NOTE — EMTALA/ACUTE CARE TRANSFER
[facility] EMERGENCY DEPARTMENT  [address] [place] [id number]  Dept: [phone]      EMTALA TRANSFER CONSENT    NAME Abdon LAINEZ 1995                              MRN [id number]    I have been informed of my rights regarding examination, treatment, and transfer   by Dr. Marisel Baugh MD    Benefits: Specialized equipment and/or services available at the receiving facility (Include comment)________________________    Risks: Potential for delay in receiving treatment, Potential deterioration of medical condition, Loss of IV, Increased discomfort during transfer, Possible worsening of condition or death during transfer      Transfer Request   I acknowledge that my medical condition has been evaluated and explained to me by the emergency department physician or other qualified medical person and/or my attending physician who has recommended and offered to me further medical examination and treatment. I understand the Hospital's obligation with respect to the treatment and stabilization of my emergency medical condition. I nevertheless request to be transferred. I release the Hospital, the doctor, and any other persons caring for me from all responsibility or liability for any injury or ill effects that may result from my transfer and agree to accept all responsibility for the consequences of my choice to transfer, rather than receive stabilizing treatment at the Hospital. I understand that because the transfer is my request, my insurance may not provide reimbursement for the services.  The Hospital will assist and direct me and my family in how to make arrangements for transfer, but the hospital is not liable for any fees charged by the transport service.  In spite of this understanding, I refuse to consent to further medical examination and treatment which has been offered to me, and request transfer to Accepting Facility Name,  City & State : [place]. I authorize the performance of emergency medical procedures and treatments upon me in both transit and upon arrival at the receiving facility.  Additionally, I authorize the release of any and all medical records to the receiving facility and request they be transported with me, if possible.    I authorize the performance of emergency medical procedures and treatments upon me in both transit and upon arrival at the receiving facility.  Additionally, I authorize the release of any and all medical records to the receiving facility and request they be transported with me, if possible.  I understand that the safest mode of transportation during a medical emergency is an ambulance and that the Hospital advocates the use of this mode of transport. Risks of traveling to the receiving facility by car, including absence of medical control, life sustaining equipment, such as oxygen, and medical personnel has been explained to me and I fully understand them.    (LA CORRECT BOX BELOW)  [  ]  I consent to the stated transfer and to be transported by ambulance/helicopter.  [  ]  I consent to the stated transfer, but refuse transportation by ambulance and accept full responsibility for my transportation by car.  I understand the risks of non-ambulance transfers and I exonerate the Hospital and its staff from any deterioration in my condition that results from this refusal.    X___________________________________________    DATE  24  TIME________  Signature of patient or legally responsible individual signing on patient behalf           RELATIONSHIP TO PATIENT_________________________          Provider Certification    NAME Abdon NavarroZoe                                        [facility] 1995                              MRN [id number]    A medical screening exam was performed on the above named patient.  Based on the examination:    Condition Necessitating Transfer The primary encounter  diagnosis was Acute periapical abscess. A diagnosis of Mandibular abscess was also pertinent to this visit.    Patient Condition: The patient has been stabilized such that within reasonable medical probability, no material deterioration of the patient condition or the condition of the unborn child(maida) is likely to result from the transfer    Reason for Transfer: Level of Care needed not available at this facility    Transfer Requirements: Facility [place]   Space available and qualified personnel available for treatment as acknowledged by    Agreed to accept transfer and to provide appropriate medical treatment as acknowledged by       TATIANA (Devonte Zheng)  Appropriate medical records of the examination and treatment of the patient are provided at the time of transfer   STAFF INITIAL WHEN COMPLETED _______  Transfer will be performed by qualified personnel from    and appropriate transfer equipment as required, including the use of necessary and appropriate life support measures.    Provider Certification: I have examined the patient and explained the following risks and benefits of being transferred/refusing transfer to the patient/family:  General risk, such as traffic hazards, adverse weather conditions, rough terrain or turbulence, possible failure of equipment (including vehicle or aircraft), or consequences of actions of persons outside the control of the transport personnel, Unanticipated needs of medical equipment and personnel during transport, Risk of worsening condition, The possibility of a transport vehicle being unavailable      Based on these reasonable risks and benefits to the patient and/or the unborn child(maida), and based upon the information available at the time of the patient’s examination, I certify that the medical benefits reasonably to be expected from the provision of appropriate medical treatments at another medical facility outweigh the increasing risks, if any, to the  individual’s medical condition, and in the case of labor to the unborn child, from effecting the transfer.    X____________________________________________ DATE 05/12/24        TIME_______      ORIGINAL - SEND TO MEDICAL RECORDS   COPY - SEND WITH PATIENT DURING TRANSFER

## 2024-05-12 NOTE — ED NOTES
Pick info:    With Macy   @[phone]  Dr. Easton Accepting   Number for report: [phone]  [facility] [id number]     Stacia Owen RN  05/12/24 8733

## 2024-05-12 NOTE — ASSESSMENT & PLAN NOTE
Presented with 2 day h/o pain at the left mandibular area. Afebrile but unable to open her mouth, unable to chew..  No facial swelling.   Patient keep taking naproxen with no help. Patient denies sore throat.     At the left mandibular area there is an area of swelling which is 1 x 1 inches the area is very tender to touch. The swelling is firm in consistency not mobile. Patient cannot open her mouth. Patient has no tenderness on the teeth. Patient has no swollen gums. There is no cervical lymphadenopathy. S/p unasyn x 1 dose    Plan  - Transferred from [place] to [place] pending [facility] intervention  - S/p I&D with OMFS 5/14 with 1 x penrose drain   - Drain to be removed tomorrow per OMFS and then dc  - Warm compress  - Pain control:   - Previously on IV abx, will introduce PO opioids  - C/w Unasyn 3g Q6 until dc, then transition to PO augmentin

## 2024-05-13 ENCOUNTER — ANESTHESIA EVENT (INPATIENT)
Dept: PERIOP | Facility: HOSPITAL | Age: 29
End: 2024-05-13
Payer: COMMERCIAL

## 2024-05-13 ENCOUNTER — ANESTHESIA (INPATIENT)
Dept: PERIOP | Facility: HOSPITAL | Age: 29
End: 2024-05-13
Payer: COMMERCIAL

## 2024-05-13 LAB
ALBUMIN SERPL BCP-MCNC: 3.3 G/DL (ref 3.5–5)
ALP SERPL-CCNC: 58 U/L (ref 34–104)
ALT SERPL W P-5'-P-CCNC: 25 U/L (ref 7–52)
ANION GAP SERPL CALCULATED.3IONS-SCNC: 7 MMOL/L (ref 4–13)
AST SERPL W P-5'-P-CCNC: 17 U/L (ref 13–39)
BASOPHILS # BLD AUTO: 0.06 THOUSANDS/ÂΜL (ref 0–0.1)
BASOPHILS NFR BLD AUTO: 1 % (ref 0–1)
BILIRUB SERPL-MCNC: 0.73 MG/DL (ref 0.2–1)
BUN SERPL-MCNC: 5 MG/DL (ref 5–25)
CALCIUM ALBUM COR SERPL-MCNC: 8.3 MG/DL (ref 8.3–10.1)
CALCIUM SERPL-MCNC: 7.7 MG/DL (ref 8.4–10.2)
CHLORIDE SERPL-SCNC: 105 MMOL/L (ref 96–108)
CO2 SERPL-SCNC: 24 MMOL/L (ref 21–32)
CREAT SERPL-MCNC: 0.42 MG/DL (ref 0.6–1.3)
EOSINOPHIL # BLD AUTO: 0.27 THOUSAND/ÂΜL (ref 0–0.61)
EOSINOPHIL NFR BLD AUTO: 3 % (ref 0–6)
ERYTHROCYTE [DISTWIDTH] IN BLOOD BY AUTOMATED COUNT: 12.5 % (ref 11.6–15.1)
GFR SERPL CREATININE-BSD FRML MDRD: 139 ML/MIN/1.73SQ M
GLUCOSE SERPL-MCNC: 76 MG/DL (ref 65–140)
HCT VFR BLD AUTO: 30 % (ref 34.8–46.1)
HGB BLD-MCNC: 9.9 G/DL (ref 11.5–15.4)
IMM GRANULOCYTES # BLD AUTO: 0.03 THOUSAND/UL (ref 0–0.2)
IMM GRANULOCYTES NFR BLD AUTO: 0 % (ref 0–2)
LYMPHOCYTES # BLD AUTO: 1.63 THOUSANDS/ÂΜL (ref 0.6–4.47)
LYMPHOCYTES NFR BLD AUTO: 16 % (ref 14–44)
MAGNESIUM SERPL-MCNC: 2.1 MG/DL (ref 1.9–2.7)
MCH RBC QN AUTO: 31.8 PG (ref 26.8–34.3)
MCHC RBC AUTO-ENTMCNC: 33 G/DL (ref 31.4–37.4)
MCV RBC AUTO: 97 FL (ref 82–98)
MONOCYTES # BLD AUTO: 0.92 THOUSAND/ÂΜL (ref 0.17–1.22)
MONOCYTES NFR BLD AUTO: 9 % (ref 4–12)
NEUTROPHILS # BLD AUTO: 7.2 THOUSANDS/ÂΜL (ref 1.85–7.62)
NEUTS SEG NFR BLD AUTO: 71 % (ref 43–75)
NRBC BLD AUTO-RTO: 0 /100 WBCS
PLATELET # BLD AUTO: 272 THOUSANDS/UL (ref 149–390)
PMV BLD AUTO: 9.9 FL (ref 8.9–12.7)
POTASSIUM SERPL-SCNC: 3.6 MMOL/L (ref 3.5–5.3)
PROT SERPL-MCNC: 5.7 G/DL (ref 6.4–8.4)
RBC # BLD AUTO: 3.11 MILLION/UL (ref 3.81–5.12)
SODIUM SERPL-SCNC: 136 MMOL/L (ref 135–147)
WBC # BLD AUTO: 10.11 THOUSAND/UL (ref 4.31–10.16)

## 2024-05-13 PROCEDURE — 85025 COMPLETE CBC W/AUTO DIFF WBC: CPT

## 2024-05-13 PROCEDURE — 80053 COMPREHEN METABOLIC PANEL: CPT

## 2024-05-13 PROCEDURE — 83735 ASSAY OF MAGNESIUM: CPT

## 2024-05-13 RX ORDER — HYDROMORPHONE HCL IN WATER/PF 6 MG/30 ML
0.2 PATIENT CONTROLLED ANALGESIA SYRINGE INTRAVENOUS EVERY 2 HOUR PRN
Status: DISCONTINUED | OUTPATIENT
Start: 2024-05-13 | End: 2024-05-13

## 2024-05-13 RX ORDER — HYDROMORPHONE HCL IN WATER/PF 6 MG/30 ML
0.2 PATIENT CONTROLLED ANALGESIA SYRINGE INTRAVENOUS EVERY 2 HOUR PRN
Status: DISCONTINUED | OUTPATIENT
Start: 2024-05-13 | End: 2024-05-15

## 2024-05-13 RX ORDER — ACETAMINOPHEN 10 MG/ML
1000 INJECTION, SOLUTION INTRAVENOUS EVERY 6 HOURS PRN
Status: DISCONTINUED | OUTPATIENT
Start: 2024-05-13 | End: 2024-05-15

## 2024-05-13 RX ORDER — HYDROMORPHONE HCL/PF 1 MG/ML
0.5 SYRINGE (ML) INJECTION EVERY 4 HOURS PRN
Status: DISCONTINUED | OUTPATIENT
Start: 2024-05-13 | End: 2024-05-15

## 2024-05-13 RX ADMIN — HYDROMORPHONE HYDROCHLORIDE 0.2 MG: 0.2 INJECTION, SOLUTION INTRAMUSCULAR; INTRAVENOUS; SUBCUTANEOUS at 23:01

## 2024-05-13 RX ADMIN — HYDROMORPHONE HYDROCHLORIDE 0.2 MG: 0.2 INJECTION, SOLUTION INTRAMUSCULAR; INTRAVENOUS; SUBCUTANEOUS at 10:55

## 2024-05-13 RX ADMIN — SODIUM CHLORIDE 3 G: 9 INJECTION, SOLUTION INTRAVENOUS at 22:49

## 2024-05-13 RX ADMIN — SODIUM CHLORIDE 3 G: 9 INJECTION, SOLUTION INTRAVENOUS at 05:14

## 2024-05-13 RX ADMIN — HYDROMORPHONE HYDROCHLORIDE 0.2 MG: 0.2 INJECTION, SOLUTION INTRAMUSCULAR; INTRAVENOUS; SUBCUTANEOUS at 02:31

## 2024-05-13 RX ADMIN — HYDROMORPHONE HYDROCHLORIDE 0.5 MG: 1 INJECTION, SOLUTION INTRAMUSCULAR; INTRAVENOUS; SUBCUTANEOUS at 12:18

## 2024-05-13 RX ADMIN — SODIUM CHLORIDE, SODIUM GLUCONATE, SODIUM ACETATE, POTASSIUM CHLORIDE, MAGNESIUM CHLORIDE, SODIUM PHOSPHATE, DIBASIC, AND POTASSIUM PHOSPHATE 100 ML/HR: .53; .5; .37; .037; .03; .012; .00082 INJECTION, SOLUTION INTRAVENOUS at 22:28

## 2024-05-13 RX ADMIN — HYDROMORPHONE HYDROCHLORIDE 0.2 MG: 0.2 INJECTION, SOLUTION INTRAMUSCULAR; INTRAVENOUS; SUBCUTANEOUS at 17:42

## 2024-05-13 RX ADMIN — SODIUM CHLORIDE 3 G: 9 INJECTION, SOLUTION INTRAVENOUS at 17:40

## 2024-05-13 RX ADMIN — SODIUM CHLORIDE 3 G: 9 INJECTION, SOLUTION INTRAVENOUS at 10:55

## 2024-05-13 RX ADMIN — HYDROMORPHONE HYDROCHLORIDE 0.2 MG: 0.2 INJECTION, SOLUTION INTRAMUSCULAR; INTRAVENOUS; SUBCUTANEOUS at 08:51

## 2024-05-13 RX ADMIN — ONDANSETRON 4 MG: 2 INJECTION INTRAMUSCULAR; INTRAVENOUS at 08:51

## 2024-05-13 RX ADMIN — ACETAMINOPHEN 1000 MG: 10 INJECTION INTRAVENOUS at 04:47

## 2024-05-13 RX ADMIN — ACETAMINOPHEN 1000 MG: 10 INJECTION INTRAVENOUS at 19:31

## 2024-05-13 NOTE — H&P
INTERNAL MEDICINE RESIDENCY ADMISSION H&P     Name: Abdon Su   Age & Sex: 28 y.o. female   MRN: [id number]  Unit/Bed#: -01   Encounter: [id number]  Primary Care Provider: No primary care provider on file.    Code Status: Level 1 - Full Code  Admission Status: OBSERVATION  Disposition: Patient requires Med/Surg    Admit to team: SOD Team A    ASSESSMENT/PLAN     Active Problems:    Dental abscess      Dental abscess  Assessment & Plan  Presented with 2 day h/o pain at the left mandibular area. Afebrile but unable to open her mouth, unable to chew..  No facial swelling.   Patient keep taking naproxen with no help. Patient denies sore throat.     At the left mandibular area there is an area of swelling which is 1 x 1 inches the area is very tender to touch. The swelling is firm in consistency not mobile. Patient cannot open her mouth. Patient has no tenderness on the teeth. Patient has no swollen gums. There is no cervical lymphadenopathy. S/p unasyn x 1 dose    Plan  - Transferred from [place] to [place] pending [facility] intervention  - NPO at [place]  - Warm compress  - Pain control:   - IV tylenol and dilaudid as patient can not tolerate PO due to pain with jaw opening   - Unasyn 3g Q6        VTE Pharmacologic Prophylaxis: ambulatory  VTE Mechanical Prophylaxis: sequential compression device    CHIEF COMPLAINT   No chief complaint on file.     HISTORY OF PRESENT ILLNESS     Patient is a 28-year-old female with no past medical history who presents with left submental dental pain.  She states that this started about 2 months ago when she was told that she had dental infection on the left lower tooth by dentist and was given amoxicillin/clavulanate 10-day course after which symptoms had improved.  She says that the dental office did not offer any surgeries because she was uninsured.  2 days ago she started having worsening dental plane and left lower jaw pain that was 10 out of 10 and not resolved  with NSAIDs.  She could not open her mouth.  She had almost no p.o. intake due to pain associated with swallowing however denies any drooling of saliva, shortness of breath, difficulty breathing.  At this time she does not have any chills or fevers.    Patient was evaluated at [place] emergency department and had stable vital signs.  Patient was transferred to [facility] for OMFS incision and drainage.  Labs significant for white count of 10.8 and CT head and neck with periapical abscess of the left third mandibular molar and phlegmon/developing abscess formation in the left posterior mandible with edema extending to the left submandibular and sublingual spaces of the floor of the mouth.  Patient also has periapical lucency of the left second mandibular molar suspicious for abscess.     REVIEW OF SYSTEMS     Review of Systems  OBJECTIVE     Vitals:    24 1901 24 1938 24 2155   BP: 95/72  93/69   BP Location:   Right arm   Pulse: 90  86   Resp: 17     Temp: 98.4 °F (36.9 °C)  98.6 °F (37 °C)   TempSrc:   Oral   SpO2: 98% 98% 98%      Temperature:   Temp (24hrs), Av.3 °F (36.8 °C), Min:97.7 °F (36.5 °C), Max:98.6 °F (37 °C)    Temperature: 98.6 °F (37 °C)  Intake & Output:  I/O          0701   0700 / 0701   0700    P.O.  120    Total Intake  120    Urine  1    Emesis/NG output  0    Total Output  1    Net  +119          Unmeasured Emesis Occurrence  1 x          Weights:        There is no height or weight on file to calculate BMI.  Weight (last 2 days)       None          Physical Exam  Vitals and nursing note reviewed.   Constitutional:       General: She is not in acute distress.     Appearance: She is well-developed.   HENT:      Head: Normocephalic and atraumatic.      Mouth/Throat:      Comments: Difficult to open mouth limited by pain  No buccal drainage  Tenderness submentally with palpable abscess on L jaw  Eyes:      Conjunctiva/sclera: Conjunctivae normal.  "  Cardiovascular:      Rate and Rhythm: Normal rate and regular rhythm.      Heart sounds: No murmur heard.  Pulmonary:      Effort: Pulmonary effort is normal. No respiratory distress.      Breath sounds: Normal breath sounds.   Abdominal:      Palpations: Abdomen is soft.      Tenderness: There is no abdominal tenderness.   Musculoskeletal:         General: No swelling.      Cervical back: Neck supple.   Skin:     General: Skin is warm and dry.      Capillary Refill: Capillary refill takes less than 2 seconds.   Neurological:      Mental Status: She is alert and oriented to person, place, and time.   Psychiatric:         Mood and Affect: Mood normal.       PAST MEDICAL HISTORY   No past medical history on file.  PAST SURGICAL HISTORY   No past surgical history on file.  SOCIAL & FAMILY HISTORY     Social History     Substance and Sexual Activity   Alcohol Use Never       Social History     Substance and Sexual Activity   Drug Use Never     Social History     Tobacco Use   Smoking Status Never   Smokeless Tobacco Never     No family history on file.  LABORATORY DATA     Labs: I have personally reviewed pertinent reports.    Results from last 7 days   Lab Units 05/12/24  1016   WBC Thousand/uL 10.78*   HEMOGLOBIN g/dL 11.7   HEMATOCRIT % 34.0*   PLATELETS Thousands/uL 312   SEGS PCT % 70   MONO PCT % 8   EOS PCT % 4      Results from last 7 days   Lab Units 05/12/24  1016   POTASSIUM mmol/L 3.9   CHLORIDE mmol/L 104   CO2 mmol/L 26   BUN mg/dL 11   CREATININE mg/dL 0.50*   CALCIUM mg/dL 8.7                          Micro:  No results found for: \"BLOODCX\", \"URINECX\", \"WOUNDCULT\", \"SPUTUMCULTUR\"  IMAGING & DIAGNOSTIC TESTS     Imaging: I have personally reviewed pertinent reports.    CT soft tissue neck with contrast    Result Date: 5/12/2024  Impression: Redemonstration of periodontal disease and periapical lucency involving the left third mandibular molar, with new breach of the lingual cortex since the prior " examination and consistent with infection/periapical abscess. Inflammatory change involving the  space with ill-defined low-attenuation marginal enhancement along the lingual aspect of the posterior left mandible is consistent with phlegmon/developing abscess formation. Edema extends into the left submandibular and sublingual spaces along the floor of mouth. Periapical lucency surrounding the left second mandibular molar is worsened and suspicious for worsening periapical abscess. Workstation performed: [id number]     EKG, Pathology, and Other Studies: I have personally reviewed pertinent reports.     ALLERGIES   No Known Allergies  MEDICATIONS PRIOR TO ARRIVAL     Prior to Admission medications    Medication Sig Start Date End Date Taking? Authorizing Provider   naproxen (NAPROSYN) 500 mg tablet Take 500 mg by mouth 2 (two) times a day with meals   Yes Historical Provider, MD   ketorolac (TORADOL) 10 mg tablet Take 1 tablet (10 mg total) by mouth every 6 (six) hours as needed for moderate pain  Patient not taking: Reported on 5/12/2024 3/12/24   Gee Limon PA-C     MEDICATIONS ADMINISTERED IN LAST 24 HOURS     Medication Administration - last 24 hours from 05/11/2024 2307 to 05/12/2024 2307         Date/Time Order Dose Route Action Action by     05/12/2024 1938 EDT HYDROmorphone HCl (DILAUDID) injection 0.2 mg 0.2 mg Intravenous Given Layna Schwab, RN     05/12/2024 2143 EDT acetaminophen (Ofirmev) injection 1,000 mg 1,000 mg Intravenous New Bag Layna Schwab, RN     05/12/2024 2015 EDT multi-electrolyte (PLASMALYTE-A/ISOLYTE-S PH 7.4) IV solution 100 mL/hr Intravenous New Bag Layna Schwab, RN     05/12/2024 2014 EDT ondansetron (ZOFRAN) injection 4 mg 4 mg Intravenous Given Layna Schwab, RN          CURRENT MEDICATIONS     Current Facility-Administered Medications   Medication Dose Route Frequency Provider Last Rate    HYDROmorphone  0.2 mg Intravenous Q2H PRN Ashly Kaufman MD       "multi-electrolyte  100 mL/hr Intravenous Continuous Emilie Soyeon Kim,  mL/hr (05/12/24 2015)    naloxone  0.04 mg Intravenous Q1MIN PRN Ashly Kaufman MD      ondansetron  4 mg Intravenous Q6H PRN Murphy Gomez MD      [START ON 5/13/2024] senna  1 tablet Oral Daily Ashly Kaufman MD       multi-electrolyte, 100 mL/hr, Last Rate: 100 mL/hr (05/12/24 2015)      HYDROmorphone, 0.2 mg, Q2H PRN  naloxone, 0.04 mg, Q1MIN PRN  ondansetron, 4 mg, Q6H PRN        Admission Time  I spent 30 minutes admitting the patient.  This involved direct patient contact where I performed a full history and physical, reviewing previous records, and reviewing laboratory and other diagnostic studies.    Portions of the record may have been created with voice recognition software.  Occasional wrong word or \"sound a like\" substitutions may have occurred due to the inherent limitations of voice recognition software.  Read the chart carefully and recognize, using context, where substitutions have occurred.    ==  Emilie Soyeon Kim, MD  [facility]  Internal Medicine Residency PGY-3    "

## 2024-05-13 NOTE — CONSULTS
Patient Name: Abdon Su YOB: 1995    Medical Record No.: [id number]     Admit/Registration Date: 5/12/2024  6:50 PM  Date of Consult: 05/12/24      Oral and Maxillofacial Surgery Consult Note    Assessment:  28 y.o. female with left mandibular pain and swelling, a/w teeth #17,18     Plan/Recs:  - OR tomorrow for I&D and extraction of indicated teeth    - Unasyn 3g q6h   - warm compress ( NOT cold)   - Fulls, NPO at midnight   -----------------------------------------    Chief Complaint:  Left mandibular pain and swelling     HPI:  28 y.o. female who presents with 2 months left jaw pain for which she was treated with PCN by a dental provider. This provided her with symptoms relieve for a time. The patient reports not being able to schedule an appointment with her dentist for extraction. Pt now presents with 2 days new worsening symptoms of pain and swelling. Pt reports odynophagia, trismus, chills, N/V ( after narcotic pain medication) and denies dyspnea, tolerating secretions and liquids. VSS. No leukocytosis,     Pain location: left mandible   Pain quality: throbbing   Pain scale: 7/10  Pain radiates to: left ear and temple   Pain relieved by: warm compress      Pre-admission records reviewed. PMH/PSH/Meds/Allergies Reviewed.    No past medical history on file.  No past surgical history on file.    No Known Allergies    Social History     Socioeconomic History    Marital status: Single     Spouse name: Not on file    Number of children: Not on file    Years of education: Not on file    Highest education level: Not on file   Occupational History    Not on file   Tobacco Use    Smoking status: Never    Smokeless tobacco: Never   Vaping Use    Vaping status: Never Used   Substance and Sexual Activity    Alcohol use: Never    Drug use: Never    Sexual activity: Not on file   Other Topics Concern    Not on file   Social History Narrative    Not on file     Social Determinants of Health  "    Financial Resource Strain: Not on file   Food Insecurity: Not on file   Transportation Needs: Not on file   Physical Activity: Not on file   Stress: Not on file   Social Connections: Not on file   Intimate Partner Violence: Not on file   Housing Stability: Not on file       Scheduled Medications  Current Facility-Administered Medications   Medication Dose Route Frequency Provider Last Rate    acetaminophen  1,000 mg Intravenous Once Emilie Soyeon Kim, MD      HYDROmorphone  0.2 mg Intravenous Q2H PRN Ashly Kaufman MD      multi-electrolyte  100 mL/hr Intravenous Continuous Ednae Soyeon Kim,  mL/hr (05/12/24 2015)    naloxone  0.04 mg Intravenous Q1MIN PRN Ashly Kaufman MD      ondansetron  4 mg Intravenous Q6H PRN Murphy Gomez MD      [START ON 5/13/2024] senna  1 tablet Oral Daily Ashly Kaufman MD         PRN Medications    HYDROmorphone    naloxone    ondansetron    Medication Infusions  multi-electrolyte, 100 mL/hr, Last Rate: 100 mL/hr (05/12/24 2015)        Review of Systems    Vitals:    Temp:  [97.7 °F (36.5 °C)-98.4 °F (36.9 °C)] 98.4 °F (36.9 °C)  HR:  [74-90] 90  Resp:  [16-17] 17  BP: ()/(51-80) 95/72  Wt Readings from Last 1 Encounters:   03/12/24 58 kg (127 lb 13.9 oz)     Ht Readings from Last 1 Encounters:   03/12/24 5' 2.99\" (1.6 m)     There is no height or weight on file to calculate BMI.  Respiratory      Lab Data (Last 4 hours)      None           O2/Vent Data (Last 4 hours)      None                  Patient Lines/Drains/Airways Status       Active Airway       None                  I/O:  Current Diet Order:        Diet Orders   (From admission, onward)                 Start     Ordered    05/13/24 0600  Diet Regular; Regular House; Dysphagia 3-Dental Soft; Thin Liquid  Diet effective now        References:    Adult Nutrition Support Algorithm    RD Therapeutic Diet Order Protocol   Question Answer Comment   Diet Type Regular    Regular " Regular House    Other Restriction(s): Dysphagia 3-Dental Soft    Liquid Modifier Thin Liquid    RD to adjust diet per protocol? Yes        05/12/24 1916    05/13/24 0001  Diet NPO; Sips with meds  Diet effective midnight        References:    Adult Nutrition Support Algorithm    RD Therapeutic Diet Order Protocol   Question Answer Comment   Diet Type NPO    NPO Except: Sips with meds    RD to adjust diet per protocol? Yes        05/12/24 1900                   No intake or output data in the 24 hours ending 05/12/24 2053    Labs:  Results from last 7 days   Lab Units 05/12/24  1016   WBC Thousand/uL 10.78*   HEMOGLOBIN g/dL 11.7   HEMATOCRIT % 34.0*   PLATELETS Thousands/uL 312     Results from last 7 days   Lab Units 05/12/24  1016   POTASSIUM mmol/L 3.9   CHLORIDE mmol/L 104   CO2 mmol/L 26   BUN mg/dL 11   CREATININE mg/dL 0.50*   CALCIUM mg/dL 8.7             Pain Management Panel           No data to display                  Imaging:   CT: neck   IMPRESSION:     Redemonstration of periodontal disease and periapical lucency involving the left third mandibular molar, with new breach of the lingual cortex since the prior examination and consistent with infection/periapical abscess.     Inflammatory change involving the  space with ill-defined low-attenuation marginal enhancement along the lingual aspect of the posterior left mandible is consistent with phlegmon/developing abscess formation. Edema extends into the left   submandibular and sublingual spaces along the floor of mouth.     Periapical lucency surrounding the left second mandibular molar is worsened and suspicious for worsening periapical abscess.          Physical Exam:   General: Integment: skin warm and dry, patient is WD/WN, Voice quality: noraml, in NAD  Neuro Exam: AAO x 3, CN V,VII grossly intact  Head: Normocephalic, no scalp lacerations or hematoma   Face: No lacerations/Abrasions/Step Offs   Ears: Pinna wnl bilaterally, no otorrhea,  hearing grossly intact,   Eyes/Periorbital:  PERRLA, EOMI  , intercanthal distance wnl,    Nose: External nose symmetrical/no gross deformity,  bilateral nares patent,    Oral Exam: exam limited by trismus, fully dentate, fair dentition, molars in the LL +TTP with + vestibular swelling. No purulence noted LYNN 10mm    Lymph/Neck Exam: Neck is soft, trachea is midline, no gross cervical lymphadenopathy bilaterally, left submandibular edema and tenderness, and LAD noted.      Jayson Padron, DMD

## 2024-05-13 NOTE — PLAN OF CARE
Problem: PAIN - ADULT  Goal: Verbalizes/displays adequate comfort level or baseline comfort level  Description: Interventions:  - Encourage patient to monitor pain and request assistance  - Assess pain using appropriate pain scale  - Administer analgesics based on type and severity of pain and evaluate response  - Implement non-pharmacological measures as appropriate and evaluate response  - Consider cultural and social influences on pain and pain management  - Notify physician/advanced practitioner if interventions unsuccessful or patient reports new pain  Outcome: Progressing     Problem: INFECTION - ADULT  Goal: Absence or prevention of progression during hospitalization  Description: INTERVENTIONS:  - Assess and monitor for signs and symptoms of infection  - Monitor lab/diagnostic results  - Monitor all insertion sites, i.e. indwelling lines, tubes, and drains  - Monitor endotracheal if appropriate and nasal secretions for changes in amount and color  - [place] appropriate cooling/warming therapies per order  - Administer medications as ordered  - Instruct and encourage patient and family to use good hand hygiene technique  - Identify and instruct in appropriate isolation precautions for identified infection/condition  Outcome: Progressing  Goal: Absence of fever/infection during neutropenic period  Description: INTERVENTIONS:  - Monitor WBC    Outcome: Progressing     Problem: DISCHARGE PLANNING  Goal: Discharge to home or other facility with appropriate resources  Description: INTERVENTIONS:  - Identify barriers to discharge w/patient and caregiver  - Arrange for needed discharge resources and transportation as appropriate  - Identify discharge learning needs (meds, wound care, etc.)  - Arrange for interpretive services to assist at discharge as needed  - Refer to Case Management Department for coordinating discharge planning if the patient needs post-hospital services based on physician/advanced  practitioner order or complex needs related to functional status, cognitive ability, or social support system  Outcome: Progressing     Problem: Knowledge Deficit  Goal: Patient/family/caregiver demonstrates understanding of disease process, treatment plan, medications, and discharge instructions  Description: Complete learning assessment and assess knowledge base.  Interventions:  - Provide teaching at level of understanding  - Provide teaching via preferred learning methods  Outcome: Progressing

## 2024-05-13 NOTE — CASE MANAGEMENT
Case Management Assessment & Discharge Planning Note    Patient name Abdon Su  Location /-01 MRN [id number]  : 1995 Date 2024       Current Admission Date: 2024  Current Admission Diagnosis:Dental abscess  Patient Active Problem List    Diagnosis Date Noted    Dental abscess 2024      LOS (days): 1  Geometric Mean LOS (GMLOS) (days):   Days to GMLOS:     OBJECTIVE:    Risk of Unplanned Readmission Score: 6.2         Current admission status: Inpatient       Preferred Pharmacy:   [facility]/pharmacy #[id number] - [place] - [address]  [address] [address]  Phone: [phone] Fax: [phone]    Primary Care Provider: No primary care provider on file.    Primary Insurance:   Secondary Insurance:     ASSESSMENT:  Active Health Care Proxies    There are no active Health Care Proxies on file.               Patient Information  Mental Status: Alert          DISCHARGE DETAILS:          Other Referral/Resources/Interventions Provided:  Referral Comments: Per chart review:  27 yo f transfer from [facility] for OMFS intervention for dental abscess, plan for I&D w/teeth extraction today.  Pt is listed as self-pay. PATHS to meet w/pt re: insurance. DCP: Anticipate home no CM needs.

## 2024-05-13 NOTE — PROGRESS NOTES
INTERNAL MEDICINE RESIDENCY PROGRESS NOTE     Name: Abdon Su   Age & Sex: 28 y.o. female   MRN: [id number]  Unit/Bed#: -João   Encounter: [id number]  Team: SOD Team A    PATIENT INFORMATION     Name: Abdon Su   Age & Sex: 28 y.o. female   MRN: [id number]  Hospital Stay Days: 1    ASSESSMENT/PLAN     Active Problems:    Dental abscess      Dental abscess  Assessment & Plan  Presented with 2 day h/o pain at the left mandibular area. Afebrile but unable to open her mouth, unable to chew..  No facial swelling.   Patient keep taking naproxen with no help. Patient denies sore throat.     At the left mandibular area there is an area of swelling which is 1 x 1 inches the area is very tender to touch. The swelling is firm in consistency not mobile. Patient cannot open her mouth. Patient has no tenderness on the teeth. Patient has no swollen gums. There is no cervical lymphadenopathy. S/p unasyn x 1 dose    Plan  - Transferred from [place] to [place] pending OMFS intervention  - NPO at [facility]  - Warm compress  - Pain control:   - IV tylenol and dilaudid as patient can not tolerate PO due to pain with jaw opening   - Unasyn 3g Q6        Disposition: awaiting O[facility] intervention today     SUBJECTIVE     Patient seen and examined. No acute events overnight. Patient states she still has 6-7/10 pain around the dental abscess. States the swelling has been slowly getting worse but denies any respiratory distress like trouble breathing or wheezing. She does endorse odynophagia, chills, nausea, vomiting; but no fever. The nausea and vomiting have improved overnight. Otherwise no CP, abdominal pain, extremity swelling, rash.     Denies any PMH of DM or other significant history.     OBJECTIVE     Vitals:    05/12/24 1938 05/12/24 2155 05/12/24 2300 05/13/24 0642   BP:  93/69  98/75   BP Location:  Right arm     Pulse:  86  95   Resp:       Temp:  98.6 °F (37 °C)  98.7 °F (37.1 °C)   TempSrc:  Oral    "  SpO2: 98% 98%  97%   Weight:   57.6 kg (127 lb)    Height:   5' 3\" (1.6 m)       Temperature:   Temp (24hrs), Av.3 °F (36.8 °C), Min:97.7 °F (36.5 °C), Max:98.7 °F (37.1 °C)    Temperature: 98.7 °F (37.1 °C)  Intake & Output:  I/O          07 07 07 07 07 0700    P.O.  120     Total Intake(mL/kg)  120 (2.1)     Urine (mL/kg/hr)  1     Emesis/NG output  0     Total Output  1     Net  +119            Unmeasured Emesis Occurrence  1 x           Weights:   IBW (Ideal Body Weight): 52.4 kg    Body mass index is 22.5 kg/m².  Weight (last 2 days)       Date/Time Weight    24 2300 57.6 (127)          Physical Exam  Vitals and nursing note reviewed.   Constitutional:       General: She is not in acute distress.     Appearance: She is well-developed.   HENT:      Head: Normocephalic and atraumatic.      Mouth/Throat:      Comments: Difficult to open mouth limited by pain  No buccal drainage  Tenderness submentally with palpable abscess on L jaw  Eyes:      Conjunctiva/sclera: Conjunctivae normal.   Cardiovascular:      Rate and Rhythm: Normal rate and regular rhythm.      Heart sounds: No murmur heard.  Pulmonary:      Effort: Pulmonary effort is normal. No respiratory distress.      Breath sounds: Normal breath sounds.   Abdominal:      Palpations: Abdomen is soft.      Tenderness: There is no abdominal tenderness.   Musculoskeletal:         General: No swelling.      Cervical back: Neck supple.   Skin:     General: Skin is warm and dry.      Capillary Refill: Capillary refill takes less than 2 seconds.   Neurological:      Mental Status: She is alert and oriented to person, place, and time.   Psychiatric:         Mood and Affect: Mood normal.       LABORATORY DATA     Labs: I have personally reviewed pertinent reports.  Results from last 7 days   Lab Units 24  0444 24  1016   WBC Thousand/uL 10.11 10.78*   HEMOGLOBIN g/dL 9.9* 11.7   HEMATOCRIT % 30.0* " 34.0*   PLATELETS Thousands/uL 272 312   SEGS PCT % 71 70   MONO PCT % 9 8   EOS PCT % 3 4      Results from last 7 days   Lab Units 05/13/24  0444 05/12/24  1016   POTASSIUM mmol/L 3.6 3.9   CHLORIDE mmol/L 105 104   CO2 mmol/L 24 26   BUN mg/dL 5 11   CREATININE mg/dL 0.42* 0.50*   CALCIUM mg/dL 7.7* 8.7   ALK PHOS U/L 58  --    ALT U/L 25  --    AST U/L 17  --      Results from last 7 days   Lab Units 05/13/24  0444   MAGNESIUM mg/dL 2.1                        IMAGING & DIAGNOSTIC TESTING     Radiology Results: I have personally reviewed pertinent reports.  CT soft tissue neck with contrast    Result Date: 5/12/2024  Impression: Redemonstration of periodontal disease and periapical lucency involving the left third mandibular molar, with new breach of the lingual cortex since the prior examination and consistent with infection/periapical abscess. Inflammatory change involving the  space with ill-defined low-attenuation marginal enhancement along the lingual aspect of the posterior left mandible is consistent with phlegmon/developing abscess formation. Edema extends into the left submandibular and sublingual spaces along the floor of mouth. Periapical lucency surrounding the left second mandibular molar is worsened and suspicious for worsening periapical abscess. Workstation performed: [id number]     Other Diagnostic Testing: I have personally reviewed pertinent reports.    ACTIVE MEDICATIONS     Current Facility-Administered Medications   Medication Dose Route Frequency    acetaminophen (Ofirmev) injection 1,000 mg  1,000 mg Intravenous Q6H PRN    ampicillin-sulbactam (UNASYN) 3 g in sodium chloride 0.9 % 100 mL IVPB  3 g Intravenous Q6H    HYDROmorphone HCl (DILAUDID) injection 0.2 mg  0.2 mg Intravenous Q2H PRN    multi-electrolyte (PLASMALYTE-A/ISOLYTE-S PH 7.4) IV solution  100 mL/hr Intravenous Continuous    naloxone (NARCAN) 0.04 mg/mL syringe 0.04 mg  0.04 mg Intravenous Q1MIN PRN    ondansetron  "(ZOFRAN) injection 4 mg  4 mg Intravenous Q6H PRN    senna (SENOKOT) tablet 8.6 mg  1 tablet Oral Daily       VTE Pharmacologic Prophylaxis: Reason for no pharmacologic prophylaxis low risk  VTE Mechanical Prophylaxis: reason for no mechanical VTE prophylaxis ambulates    Portions of the record may have been created with voice recognition software.  Occasional wrong word or \"sound a like\" substitutions may have occurred due to the inherent limitations of voice recognition software.  Read the chart carefully and recognize, using context, where substitutions have occurred.  ==  Devonte Santana MD  [facility]  Internal Medicine Residency PGY-1      "

## 2024-05-13 NOTE — PLAN OF CARE
Problem: PAIN - ADULT  Goal: Verbalizes/displays adequate comfort level or baseline comfort level  Description: Interventions:  - Encourage patient to monitor pain and request assistance  - Assess pain using appropriate pain scale  - Administer analgesics based on type and severity of pain and evaluate response  - Implement non-pharmacological measures as appropriate and evaluate response  - Consider cultural and social influences on pain and pain management  - Notify physician/advanced practitioner if interventions unsuccessful or patient reports new pain  Outcome: Progressing     Problem: INFECTION - ADULT  Goal: Absence or prevention of progression during hospitalization  Description: INTERVENTIONS:  - Assess and monitor for signs and symptoms of infection  - Monitor lab/diagnostic results  - Monitor all insertion sites, i.e. indwelling lines, tubes, and drains  - Monitor endotracheal if appropriate and nasal secretions for changes in amount and color  - [place] appropriate cooling/warming therapies per order  - Administer medications as ordered  - Instruct and encourage patient and family to use good hand hygiene technique  - Identify and instruct in appropriate isolation precautions for identified infection/condition  Outcome: Progressing  Goal: Absence of fever/infection during neutropenic period  Description: INTERVENTIONS:  - Monitor WBC    Outcome: Progressing     Problem: DISCHARGE PLANNING  Goal: Discharge to home or other facility with appropriate resources  Description: INTERVENTIONS:  - Identify barriers to discharge w/patient and caregiver  - Arrange for needed discharge resources and transportation as appropriate  - Identify discharge learning needs (meds, wound care, etc.)  - Arrange for interpretive services to assist at discharge as needed  - Refer to Case Management Department for coordinating discharge planning if the patient needs post-hospital services based on physician/advanced  practitioner order or complex needs related to functional status, cognitive ability, or social support system  Outcome: Progressing     Problem: Knowledge Deficit  Goal: Patient/family/caregiver demonstrates understanding of disease process, treatment plan, medications, and discharge instructions  Description: Complete learning assessment and assess knowledge base.  Interventions:  - Provide teaching at level of understanding  - Provide teaching via preferred learning methods  Outcome: Progressing

## 2024-05-14 LAB
BASOPHILS # BLD AUTO: 0.03 THOUSANDS/ÂΜL (ref 0–0.1)
BASOPHILS NFR BLD AUTO: 0 % (ref 0–1)
EOSINOPHIL # BLD AUTO: 0.34 THOUSAND/ÂΜL (ref 0–0.61)
EOSINOPHIL NFR BLD AUTO: 3 % (ref 0–6)
ERYTHROCYTE [DISTWIDTH] IN BLOOD BY AUTOMATED COUNT: 12 % (ref 11.6–15.1)
HCT VFR BLD AUTO: 31.9 % (ref 34.8–46.1)
HGB BLD-MCNC: 10.6 G/DL (ref 11.5–15.4)
IMM GRANULOCYTES # BLD AUTO: 0.05 THOUSAND/UL (ref 0–0.2)
IMM GRANULOCYTES NFR BLD AUTO: 0 % (ref 0–2)
LYMPHOCYTES # BLD AUTO: 1.27 THOUSANDS/ÂΜL (ref 0.6–4.47)
LYMPHOCYTES NFR BLD AUTO: 11 % (ref 14–44)
MCH RBC QN AUTO: 31.8 PG (ref 26.8–34.3)
MCHC RBC AUTO-ENTMCNC: 33.2 G/DL (ref 31.4–37.4)
MCV RBC AUTO: 96 FL (ref 82–98)
MONOCYTES # BLD AUTO: 1.08 THOUSAND/ÂΜL (ref 0.17–1.22)
MONOCYTES NFR BLD AUTO: 10 % (ref 4–12)
NEUTROPHILS # BLD AUTO: 8.42 THOUSANDS/ÂΜL (ref 1.85–7.62)
NEUTS SEG NFR BLD AUTO: 76 % (ref 43–75)
NRBC BLD AUTO-RTO: 0 /100 WBCS
PLATELET # BLD AUTO: 335 THOUSANDS/UL (ref 149–390)
PMV BLD AUTO: 9.4 FL (ref 8.9–12.7)
RBC # BLD AUTO: 3.33 MILLION/UL (ref 3.81–5.12)
WBC # BLD AUTO: 11.19 THOUSAND/UL (ref 4.31–10.16)

## 2024-05-14 PROCEDURE — 87075 CULTR BACTERIA EXCEPT BLOOD: CPT | Performed by: DENTIST

## 2024-05-14 PROCEDURE — 85025 COMPLETE CBC W/AUTO DIFF WBC: CPT

## 2024-05-14 PROCEDURE — 0W930ZZ DRAINAGE OF ORAL CAVITY AND THROAT, OPEN APPROACH: ICD-10-PCS | Performed by: DENTIST

## 2024-05-14 PROCEDURE — 0CTX0Z0 RESECTION OF LOWER TOOTH, SINGLE, OPEN APPROACH: ICD-10-PCS | Performed by: DENTIST

## 2024-05-14 PROCEDURE — 87070 CULTURE OTHR SPECIMN AEROBIC: CPT | Performed by: DENTIST

## 2024-05-14 PROCEDURE — 0N9V30Z DRAINAGE OF LEFT MANDIBLE WITH DRAINAGE DEVICE, PERCUTANEOUS APPROACH: ICD-10-PCS | Performed by: DENTIST

## 2024-05-14 PROCEDURE — 0CDXXZ0 EXTRACTION OF LOWER TOOTH, SINGLE, EXTERNAL APPROACH: ICD-10-PCS | Performed by: DENTIST

## 2024-05-14 PROCEDURE — 87205 SMEAR GRAM STAIN: CPT | Performed by: DENTIST

## 2024-05-14 RX ORDER — SODIUM CHLORIDE, SODIUM LACTATE, POTASSIUM CHLORIDE, CALCIUM CHLORIDE 600; 310; 30; 20 MG/100ML; MG/100ML; MG/100ML; MG/100ML
INJECTION, SOLUTION INTRAVENOUS CONTINUOUS PRN
Status: DISCONTINUED | OUTPATIENT
Start: 2024-05-14 | End: 2024-05-14

## 2024-05-14 RX ORDER — PROPOFOL 10 MG/ML
INJECTION, EMULSION INTRAVENOUS AS NEEDED
Status: DISCONTINUED | OUTPATIENT
Start: 2024-05-14 | End: 2024-05-14

## 2024-05-14 RX ORDER — FENTANYL CITRATE/PF 50 MCG/ML
25 SYRINGE (ML) INJECTION
Status: DISCONTINUED | OUTPATIENT
Start: 2024-05-14 | End: 2024-05-14 | Stop reason: HOSPADM

## 2024-05-14 RX ORDER — MIDAZOLAM HYDROCHLORIDE 2 MG/2ML
INJECTION, SOLUTION INTRAMUSCULAR; INTRAVENOUS AS NEEDED
Status: DISCONTINUED | OUTPATIENT
Start: 2024-05-14 | End: 2024-05-14

## 2024-05-14 RX ORDER — DIPHENHYDRAMINE HYDROCHLORIDE 50 MG/ML
12.5 INJECTION INTRAMUSCULAR; INTRAVENOUS ONCE AS NEEDED
Status: DISCONTINUED | OUTPATIENT
Start: 2024-05-14 | End: 2024-05-14 | Stop reason: HOSPADM

## 2024-05-14 RX ORDER — ONDANSETRON 2 MG/ML
4 INJECTION INTRAMUSCULAR; INTRAVENOUS ONCE AS NEEDED
Status: DISCONTINUED | OUTPATIENT
Start: 2024-05-14 | End: 2024-05-14 | Stop reason: HOSPADM

## 2024-05-14 RX ORDER — SUCCINYLCHOLINE/SOD CL,ISO/PF 100 MG/5ML
SYRINGE (ML) INTRAVENOUS AS NEEDED
Status: DISCONTINUED | OUTPATIENT
Start: 2024-05-14 | End: 2024-05-14

## 2024-05-14 RX ORDER — LIDOCAINE HYDROCHLORIDE AND EPINEPHRINE 10; 10 MG/ML; UG/ML
INJECTION, SOLUTION INFILTRATION; PERINEURAL AS NEEDED
Status: DISCONTINUED | OUTPATIENT
Start: 2024-05-14 | End: 2024-05-14 | Stop reason: HOSPADM

## 2024-05-14 RX ORDER — HYDROMORPHONE HCL/PF 1 MG/ML
0.5 SYRINGE (ML) INJECTION
Status: DISCONTINUED | OUTPATIENT
Start: 2024-05-14 | End: 2024-05-14 | Stop reason: HOSPADM

## 2024-05-14 RX ORDER — DEXAMETHASONE SODIUM PHOSPHATE 10 MG/ML
INJECTION, SOLUTION INTRAMUSCULAR; INTRAVENOUS AS NEEDED
Status: DISCONTINUED | OUTPATIENT
Start: 2024-05-14 | End: 2024-05-14

## 2024-05-14 RX ORDER — FENTANYL CITRATE 50 UG/ML
INJECTION, SOLUTION INTRAMUSCULAR; INTRAVENOUS AS NEEDED
Status: DISCONTINUED | OUTPATIENT
Start: 2024-05-14 | End: 2024-05-14

## 2024-05-14 RX ORDER — LIDOCAINE HYDROCHLORIDE 10 MG/ML
INJECTION, SOLUTION EPIDURAL; INFILTRATION; INTRACAUDAL; PERINEURAL AS NEEDED
Status: DISCONTINUED | OUTPATIENT
Start: 2024-05-14 | End: 2024-05-14

## 2024-05-14 RX ORDER — ONDANSETRON 2 MG/ML
INJECTION INTRAMUSCULAR; INTRAVENOUS AS NEEDED
Status: DISCONTINUED | OUTPATIENT
Start: 2024-05-14 | End: 2024-05-14

## 2024-05-14 RX ADMIN — HYDROMORPHONE HYDROCHLORIDE 0.5 MG: 1 INJECTION, SOLUTION INTRAMUSCULAR; INTRAVENOUS; SUBCUTANEOUS at 17:09

## 2024-05-14 RX ADMIN — SODIUM CHLORIDE 3 G: 9 INJECTION, SOLUTION INTRAVENOUS at 23:29

## 2024-05-14 RX ADMIN — ACETAMINOPHEN 1000 MG: 10 INJECTION INTRAVENOUS at 03:12

## 2024-05-14 RX ADMIN — FENTANYL CITRATE 50 MCG: 50 INJECTION INTRAMUSCULAR; INTRAVENOUS at 16:03

## 2024-05-14 RX ADMIN — ONDANSETRON 4 MG: 2 INJECTION INTRAMUSCULAR; INTRAVENOUS at 15:55

## 2024-05-14 RX ADMIN — SODIUM CHLORIDE, SODIUM LACTATE, POTASSIUM CHLORIDE, AND CALCIUM CHLORIDE: .6; .31; .03; .02 INJECTION, SOLUTION INTRAVENOUS at 15:15

## 2024-05-14 RX ADMIN — HYDROMORPHONE HYDROCHLORIDE 0.5 MG: 1 INJECTION, SOLUTION INTRAMUSCULAR; INTRAVENOUS; SUBCUTANEOUS at 13:00

## 2024-05-14 RX ADMIN — HYDROMORPHONE HYDROCHLORIDE 0.2 MG: 0.2 INJECTION, SOLUTION INTRAMUSCULAR; INTRAVENOUS; SUBCUTANEOUS at 01:17

## 2024-05-14 RX ADMIN — FENTANYL CITRATE 25 MCG: 50 INJECTION INTRAMUSCULAR; INTRAVENOUS at 16:43

## 2024-05-14 RX ADMIN — SODIUM CHLORIDE, SODIUM GLUCONATE, SODIUM ACETATE, POTASSIUM CHLORIDE, MAGNESIUM CHLORIDE, SODIUM PHOSPHATE, DIBASIC, AND POTASSIUM PHOSPHATE 100 ML/HR: .53; .5; .37; .037; .03; .012; .00082 INJECTION, SOLUTION INTRAVENOUS at 09:01

## 2024-05-14 RX ADMIN — LIDOCAINE HYDROCHLORIDE 50 MG: 10 INJECTION, SOLUTION EPIDURAL; INFILTRATION; INTRACAUDAL; PERINEURAL at 15:55

## 2024-05-14 RX ADMIN — FENTANYL CITRATE 25 MCG: 50 INJECTION INTRAMUSCULAR; INTRAVENOUS at 16:50

## 2024-05-14 RX ADMIN — SODIUM CHLORIDE, SODIUM GLUCONATE, SODIUM ACETATE, POTASSIUM CHLORIDE, MAGNESIUM CHLORIDE, SODIUM PHOSPHATE, DIBASIC, AND POTASSIUM PHOSPHATE 100 ML/HR: .53; .5; .37; .037; .03; .012; .00082 INJECTION, SOLUTION INTRAVENOUS at 23:29

## 2024-05-14 RX ADMIN — Medication 100 MG: at 15:55

## 2024-05-14 RX ADMIN — FENTANYL CITRATE 50 MCG: 50 INJECTION INTRAMUSCULAR; INTRAVENOUS at 15:55

## 2024-05-14 RX ADMIN — HYDROMORPHONE HYDROCHLORIDE 0.2 MG: 0.2 INJECTION, SOLUTION INTRAMUSCULAR; INTRAVENOUS; SUBCUTANEOUS at 20:53

## 2024-05-14 RX ADMIN — SODIUM CHLORIDE 3 G: 9 INJECTION, SOLUTION INTRAVENOUS at 05:05

## 2024-05-14 RX ADMIN — SODIUM CHLORIDE 3 G: 9 INJECTION, SOLUTION INTRAVENOUS at 15:54

## 2024-05-14 RX ADMIN — HYDROMORPHONE HYDROCHLORIDE 0.2 MG: 0.2 INJECTION, SOLUTION INTRAMUSCULAR; INTRAVENOUS; SUBCUTANEOUS at 18:00

## 2024-05-14 RX ADMIN — ACETAMINOPHEN 1000 MG: 10 INJECTION INTRAVENOUS at 09:27

## 2024-05-14 RX ADMIN — ONDANSETRON 4 MG: 2 INJECTION INTRAMUSCULAR; INTRAVENOUS at 13:00

## 2024-05-14 RX ADMIN — PROPOFOL 200 MG: 10 INJECTION, EMULSION INTRAVENOUS at 15:55

## 2024-05-14 RX ADMIN — SODIUM CHLORIDE 3 G: 9 INJECTION, SOLUTION INTRAVENOUS at 12:03

## 2024-05-14 RX ADMIN — HYDROMORPHONE HYDROCHLORIDE 0.5 MG: 1 INJECTION, SOLUTION INTRAMUSCULAR; INTRAVENOUS; SUBCUTANEOUS at 22:04

## 2024-05-14 RX ADMIN — DEXAMETHASONE SODIUM PHOSPHATE 10 MG: 10 INJECTION, SOLUTION INTRAMUSCULAR; INTRAVENOUS at 15:55

## 2024-05-14 RX ADMIN — FENTANYL CITRATE 25 MCG: 50 INJECTION INTRAMUSCULAR; INTRAVENOUS at 16:57

## 2024-05-14 RX ADMIN — MIDAZOLAM 2 MG: 1 INJECTION INTRAMUSCULAR; INTRAVENOUS at 15:50

## 2024-05-14 RX ADMIN — FENTANYL CITRATE 25 MCG: 50 INJECTION INTRAMUSCULAR; INTRAVENOUS at 17:05

## 2024-05-14 RX ADMIN — SODIUM CHLORIDE, SODIUM LACTATE, POTASSIUM CHLORIDE, AND CALCIUM CHLORIDE: .6; .31; .03; .02 INJECTION, SOLUTION INTRAVENOUS at 15:42

## 2024-05-14 NOTE — PLAN OF CARE
Problem: PAIN - ADULT  Goal: Verbalizes/displays adequate comfort level or baseline comfort level  Description: Interventions:  - Encourage patient to monitor pain and request assistance  - Assess pain using appropriate pain scale  - Administer analgesics based on type and severity of pain and evaluate response  - Implement non-pharmacological measures as appropriate and evaluate response  - Consider cultural and social influences on pain and pain management  - Notify physician/advanced practitioner if interventions unsuccessful or patient reports new pain  Outcome: Progressing     Problem: INFECTION - ADULT  Goal: Absence or prevention of progression during hospitalization  Description: INTERVENTIONS:  - Assess and monitor for signs and symptoms of infection  - Monitor lab/diagnostic results  - Monitor all insertion sites, i.e. indwelling lines, tubes, and drains  - Monitor endotracheal if appropriate and nasal secretions for changes in amount and color  - [place] appropriate cooling/warming therapies per order  - Administer medications as ordered  - Instruct and encourage patient and family to use good hand hygiene technique  - Identify and instruct in appropriate isolation precautions for identified infection/condition  Outcome: Progressing  Goal: Absence of fever/infection during neutropenic period  Description: INTERVENTIONS:  - Monitor WBC    Outcome: Progressing     Problem: DISCHARGE PLANNING  Goal: Discharge to home or other facility with appropriate resources  Description: INTERVENTIONS:  - Identify barriers to discharge w/patient and caregiver  - Arrange for needed discharge resources and transportation as appropriate  - Identify discharge learning needs (meds, wound care, etc.)  - Arrange for interpretive services to assist at discharge as needed  - Refer to Case Management Department for coordinating discharge planning if the patient needs post-hospital services based on physician/advanced  practitioner order or complex needs related to functional status, cognitive ability, or social support system  Outcome: Progressing     Problem: Knowledge Deficit  Goal: Patient/family/caregiver demonstrates understanding of disease process, treatment plan, medications, and discharge instructions  Description: Complete learning assessment and assess knowledge base.  Interventions:  - Provide teaching at level of understanding  - Provide teaching via preferred learning methods  Outcome: Progressing

## 2024-05-14 NOTE — PLAN OF CARE
Problem: PAIN - ADULT  Goal: Verbalizes/displays adequate comfort level or baseline comfort level  Description: Interventions:  - Encourage patient to monitor pain and request assistance  - Assess pain using appropriate pain scale  - Administer analgesics based on type and severity of pain and evaluate response  - Implement non-pharmacological measures as appropriate and evaluate response  - Consider cultural and social influences on pain and pain management  - Notify physician/advanced practitioner if interventions unsuccessful or patient reports new pain  Outcome: Progressing     Problem: INFECTION - ADULT  Goal: Absence or prevention of progression during hospitalization  Description: INTERVENTIONS:  - Assess and monitor for signs and symptoms of infection  - Monitor lab/diagnostic results  - Monitor all insertion sites, i.e. indwelling lines, tubes, and drains  - Monitor endotracheal if appropriate and nasal secretions for changes in amount and color  - [place] appropriate cooling/warming therapies per order  - Administer medications as ordered  - Instruct and encourage patient and family to use good hand hygiene technique  - Identify and instruct in appropriate isolation precautions for identified infection/condition  Outcome: Progressing  Goal: Absence of fever/infection during neutropenic period  Description: INTERVENTIONS:  - Monitor WBC    Outcome: Progressing     Problem: DISCHARGE PLANNING  Goal: Discharge to home or other facility with appropriate resources  Description: INTERVENTIONS:  - Identify barriers to discharge w/patient and caregiver  - Arrange for needed discharge resources and transportation as appropriate  - Identify discharge learning needs (meds, wound care, etc.)  - Arrange for interpretive services to assist at discharge as needed  - Refer to Case Management Department for coordinating discharge planning if the patient needs post-hospital services based on physician/advanced  practitioner order or complex needs related to functional status, cognitive ability, or social support system  Outcome: Progressing     Problem: Knowledge Deficit  Goal: Patient/family/caregiver demonstrates understanding of disease process, treatment plan, medications, and discharge instructions  Description: Complete learning assessment and assess knowledge base.  Interventions:  - Provide teaching at level of understanding  - Provide teaching via preferred learning methods  Outcome: Progressing

## 2024-05-14 NOTE — PROGRESS NOTES
Spiritual Care Progress Note    2024  Patient: Abdon Su : 1995  Admission Date & Time: 2024 1850  MRN: [id number] CSN: [id number]        Fr Monk met with the pt and provided prayers and blessings. No further needs were expressed at this time.    Chaplains still remain available.       24 1300   Clinical Encounter Type   Visited With Patient   Congregational Encounters   Congregational Needs Prayer

## 2024-05-14 NOTE — PROGRESS NOTES
"    INTERNAL MEDICINE RESIDENCY PROGRESS NOTE     Name: Abdon Su   Age & Sex: 28 y.o. female   MRN: [id number]  Unit/Bed#: -João   Encounter: [id number]  Team: SOD Team A    PATIENT INFORMATION     Name: Abdon Su   Age & Sex: 28 y.o. female   MRN: [id number]  Hospital Stay Days: 2    ASSESSMENT/PLAN     * Dental abscess  Assessment & Plan  Presented with 2 day h/o pain at the left mandibular area. Afebrile but unable to open her mouth, unable to chew..  No facial swelling.   Patient keep taking naproxen with no help. Patient denies sore throat.     At the left mandibular area there is an area of swelling which is 1 x 1 inches the area is very tender to touch. The swelling is firm in consistency not mobile. Patient cannot open her mouth. Patient has no tenderness on the teeth. Patient has no swollen gums. There is no cervical lymphadenopathy. S/p unasyn x 1 dose    Plan  - Transferred from [place] to [place] pending OMFS intervention  - NPO at [place]  - Warm compress  - Pain control:   - IV tylenol and dilaudid as patient can not tolerate PO due to pain with jaw opening   - Unasyn 3g Q6            Disposition: Surgery with OMFS today      SUBJECTIVE     Patient seen and examined. No acute events overnight. Patient states her pain is about an 8/10 prior to any pain meds and is controlled with pain medicine. She denies difficulty breathing. Endorses odynophagia and nausea but no vomiting and fever.     OBJECTIVE     Vitals:    24 2300 24 0642 24 1200 24 1439   BP:  98/75  (!) 87/51   BP Location:       Pulse:  95 96 99   Resp:    16   Temp:  98.7 °F (37.1 °C)  99.5 °F (37.5 °C)   TempSrc:       SpO2:  97% 98% 97%   Weight: 57.6 kg (127 lb)      Height: 5' 3\" (1.6 m)         Temperature:   Temp (24hrs), Av.1 °F (37.3 °C), Min:98.7 °F (37.1 °C), Max:99.5 °F (37.5 °C)    Temperature: 99.5 °F (37.5 °C)  Intake & Output:  I/O          07 07 " 0701  05/14 0700    P.O. 120     I.V. (mL/kg)  1030 (17.9)    IV Piggyback  200    Total Intake(mL/kg) 120 (2.1) 1230 (21.4)    Urine (mL/kg/hr) 1     Emesis/NG output 0     Total Output 1     Net +119 +1230          Unmeasured Urine Occurrence  1 x    Unmeasured Emesis Occurrence 1 x           Weights:   IBW (Ideal Body Weight): 52.4 kg    Body mass index is 22.5 kg/m².  Weight (last 2 days)       Date/Time Weight    05/12/24 2300 57.6 (127)          Physical Exam  Vitals and nursing note reviewed.   Constitutional:       General: She is not in acute distress.     Appearance: She is well-developed.   HENT:      Head: Normocephalic and atraumatic.      Mouth/Throat:      Comments: Difficult to open mouth 2/2 to pain  No buccal drainage  Tenderness submentally with palpable abscess on L jaw  Eyes:      Conjunctiva/sclera: Conjunctivae normal.   Cardiovascular:      Rate and Rhythm: Normal rate and regular rhythm.      Heart sounds: No murmur heard.  Pulmonary:      Effort: Pulmonary effort is normal. No respiratory distress.      Breath sounds: Normal breath sounds.   Abdominal:      Palpations: Abdomen is soft.      Tenderness: There is no abdominal tenderness.   Musculoskeletal:         General: No swelling.      Cervical back: Neck supple.   Skin:     General: Skin is warm and dry.      Capillary Refill: Capillary refill takes less than 2 seconds.   Neurological:      Mental Status: She is alert and oriented to person, place, and time.   Psychiatric:         Mood and Affect: Mood normal.       LABORATORY DATA     Labs: I have personally reviewed pertinent reports.  Results from last 7 days   Lab Units 05/14/24  0511 05/13/24 0444 05/12/24  1016   WBC Thousand/uL 11.19* 10.11 10.78*   HEMOGLOBIN g/dL 10.6* 9.9* 11.7   HEMATOCRIT % 31.9* 30.0* 34.0*   PLATELETS Thousands/uL 335 272 312   SEGS PCT % 76* 71 70   MONO PCT % 10 9 8   EOS PCT % 3 3 4      Results from last 7 days   Lab Units 05/13/24  0444  05/12/24  1016   POTASSIUM mmol/L 3.6 3.9   CHLORIDE mmol/L 105 104   CO2 mmol/L 24 26   BUN mg/dL 5 11   CREATININE mg/dL 0.42* 0.50*   CALCIUM mg/dL 7.7* 8.7   ALK PHOS U/L 58  --    ALT U/L 25  --    AST U/L 17  --      Results from last 7 days   Lab Units 05/13/24  0444   MAGNESIUM mg/dL 2.1                        IMAGING & DIAGNOSTIC TESTING     Radiology Results: I have personally reviewed pertinent reports.  CT soft tissue neck with contrast    Result Date: 5/12/2024  Impression: Redemonstration of periodontal disease and periapical lucency involving the left third mandibular molar, with new breach of the lingual cortex since the prior examination and consistent with infection/periapical abscess. Inflammatory change involving the  space with ill-defined low-attenuation marginal enhancement along the lingual aspect of the posterior left mandible is consistent with phlegmon/developing abscess formation. Edema extends into the left submandibular and sublingual spaces along the floor of mouth. Periapical lucency surrounding the left second mandibular molar is worsened and suspicious for worsening periapical abscess. Workstation performed: [id number]     Other Diagnostic Testing: I have personally reviewed pertinent reports.    ACTIVE MEDICATIONS     Current Facility-Administered Medications   Medication Dose Route Frequency    acetaminophen (Ofirmev) injection 1,000 mg  1,000 mg Intravenous Q6H PRN    ampicillin-sulbactam (UNASYN) 3 g in sodium chloride 0.9 % 100 mL IVPB  3 g Intravenous Q6H    HYDROmorphone (DILAUDID) injection 0.5 mg  0.5 mg Intravenous Q4H PRN    HYDROmorphone HCl (DILAUDID) injection 0.2 mg  0.2 mg Intravenous Q2H PRN    multi-electrolyte (PLASMALYTE-A/ISOLYTE-S PH 7.4) IV solution  100 mL/hr Intravenous Continuous    naloxone (NARCAN) 0.04 mg/mL syringe 0.04 mg  0.04 mg Intravenous Q1MIN PRN    ondansetron (ZOFRAN) injection 4 mg  4 mg Intravenous Q6H PRN    senna (SENOKOT) tablet  "8.6 mg  1 tablet Oral Daily       VTE Pharmacologic Prophylaxis: Reason for no pharmacologic prophylaxis low risk  VTE Mechanical Prophylaxis: sequential compression device and reason for no mechanical VTE prophylaxis ambulates    Portions of the record may have been created with voice recognition software.  Occasional wrong word or \"sound a like\" substitutions may have occurred due to the inherent limitations of voice recognition software.  Read the chart carefully and recognize, using context, where substitutions have occurred.  ==  Hernan Wagner DO  [facility]  Internal Medicine Residency PGY-1      "

## 2024-05-14 NOTE — OP NOTE
OPERATIVE REPORT  PATIENT NAME: Abdon Su    :  1995  MRN: [id number]  Pt Location: BE OR ROOM 08    SURGERY DATE: 2024    Surgeons and Role:     * Devonte Cooper DDS - Primary    Preop Diagnosis:  Dental abscess [K04.7]    Post-Op Diagnosis Codes:     * Dental abscess [K04.7]    Procedure(s):  1 Extraoral I&D left submandibular space  2.Extraoral I&D left sublingual space  3. Extraoral I&D left  space  4. Intraoral I&D left submandibular space  5.Intraoral I&D left sublingual space  6.Intraoral I&D left  space  7. Intraoral I&D left parapharyngeal space  8. Surgical extraction tooth #18  9. Extraction full bone impacted tooth #17    Specimen(s):  ID Type Source Tests Collected by Time Destination   A : LEFT PARAPHARYNGEAL SPACE Wound Wound ANAEROBIC CULTURE AND GRAM STAIN, WOUND CULTURE Devonte Cooper DDS 2024 1609        Estimated Blood Loss:   Minimal    Drains:  Open Drain Left  (Active)   Site Description Unable to view 24   Dressing Status Clean;Dry;Intact 24   Drainage Appearance None 24   Output (mL) 0 mL 24   Number of days: 0       Anesthesia Type:   General    Operative Indications:  Dental abscess [K04.7]      Operative Findings:  Multiple deep fascial space infection    Complications:   None    Procedure and Technique:  The patient was prepped and draped in the usual fashion.  A time out was performed.  The oropharynx was suctioned.  1% lidocaine with epinephrine was used to infiltrate the left neck and perform a left mandibular and long buccal block.  Attention was first directed to the left neck.  A 1cm incision was made approximately 2 finger breadths below the inferior border of the mandible.  Blunt dissection was performed with a Danni hemostat to the inferior border of the mandible.  The left submandibular, sublingual, and  spaces were explored with the hemostat.  A penrose drain was placed in  the left submandibular space and secured to the skin with  3.0 silk sutures.  Attention was then directed to the oral cavity.  A full thickness flap was elevated from tooth #18 and continued posteriorly with a distal buccal release over the ascending ramus.  Buccal bone was removed and tooth #18 was sectioned and removed without complication.  Bone overlying the occlusal aspect of tooth #17 was removed and this tooth was then elevated and extracted without complication.  The sockets were curetted and irrigated with NS.  A full thickness lingual flap was then elevated in the left posterior mandible.  The hemostat was then introduced into the left submandibular, sublingual, , and parapharyngeal spaces.  All spaces were explored bluntly.  Copious purulent flow was established from the left parapharyngeal space.  Aerobic and anaerobic cultures were obtained.  The sites were irrigated with NS.  The wound was loosely tacked with 3.0 chromic gut sutures.  The oropharynx was again suctioned and a dental pack was placed.  Hemostasis was noted at the completion of the procedure.  All counts were correct.       I was present for the entire procedure.    Patient Disposition:  PACU         SIGNATURE: Devonte Cooper DDS  DATE: May 14, 2024  TIME: 7:55 PM

## 2024-05-14 NOTE — QUICK NOTE
28 F with left submandibular cellulitis a/w teeth #17 and 18. PT planned to I&D and extraction of teeth 17 and 18 in the OR 5/13, but OR availability was scarce. Planned for OR this afternoon 5pm or later if OR scheduling permits

## 2024-05-14 NOTE — UTILIZATION REVIEW
Initial Clinical Review    Admission: Date/Time/Statement:   Admission Orders (From admission, onward)       Ordered        05/12/24 1855  INPATIENT ADMISSION  Once                          Orders Placed This Encounter   Procedures    INPATIENT ADMISSION     Standing Status:   Standing     Number of Occurrences:   1     Order Specific Question:   Level of Care     Answer:   Med Surg [16]     Order Specific Question:   Estimated length of stay     Answer:   More than 2 Midnights     Order Specific Question:   Certification     Answer:   I certify that inpatient services are medically necessary for this patient for a duration of greater than two midnights. See H&P and MD Progress Notes for additional information about the patient's course of treatment.     ED Arrival Information       Patient not seen in ED                         Initial Presentation: 28 y.o. female, Transfer from [facility] ED initially presented there today for submental dental pain. Per pt,  this started about 2 months ago when she was told that she had dental infection on the left lower tooth by dentist and was given amoxicillin/clavulanate 10-day course after which symptoms had improved.  She says that the dental office did not offer any surgeries because she was uninsured.  2 days ago she started having worsening dental plane and left lower jaw pain that was 10 out of 10 and not resolved with NSAIDs.  She could not open her mouth.  She had almost no p.o. intake due to pain associated with swallowing. Transferred for OMFS services. Labs significant for Wbc of 10.8. CT Head and Neck with periapical abscess of the left third mandibular molar and phlegmon/developing abscess formation in the left posterior mandible with edema extending to the left submandibular and sublingual spaces of the floor of the mouth. Pt also has periapical lucency of the left second mandibular molar suspicious for abscess.   Admit to Inpatient Dx; Dental abscess. NPO at [facility].  Iv antibiotics. Warm compress. Pain control.  On exam; Difficulty to open mouth limited by pain. Tenderness submentally with palpable abscess on L jaw.    5/12  OMFS cons; Left mandibular pain and swelling, a/w teeth #17,18.  Plan OR for tomorrow I&D and  extraction of indicated teeth .  Continue Iv antibiotics and IVFs. NPO at [facility].           Vitals   Temperature Pulse Respirations Blood Pressure SpO2   05/12/24 1901 05/12/24 1901 05/12/24 1901 05/12/24 1901 05/12/24 1901   98.4 °F (36.9 °C) 90 17 95/72 98 %      Temp Source Heart Rate Source Patient Position - Orthostatic VS BP Location FiO2 (%)   05/12/24 2155 -- 05/12/24 2155 05/12/24 2155 --   Oral  Lying Right arm       Pain Score       05/12/24 1928       8          Wt Readings from Last 1 Encounters:   05/12/24 57.6 kg (127 lb)     Additional Vital Signs:   05/12/24 21:55:42 98.6 °F (37 °C) 86 -- 93/69 77 98 % -- Lying   05/12/24 1938 -- -- -- -- -- 98 % -- --   05/12/24 1928 -- -- -- -- -- -- None (Room air)      05/13/24 14:39:33 99.5 °F (37.5 °C) 99 16 87/51 Abnormal  63 Abnormal  97 % -- --   05/13/24 1218 -- -- -- -- -- -- None (Room air) --     Pertinent Labs/Diagnostic Test Results:   5/12  CT soft tissue neck w contrast - Redemonstration of periodontal disease and periapical lucency involving the left third mandibular molar, with new breach of the lingual cortex since the prior examination and consistent with infection/periapical abscess.  Inflammatory change involving the  space with ill-defined low-attenuation marginal enhancement along the lingual aspect of the posterior left mandible is consistent with phlegmon/developing abscess formation. Edema extends into the left   submandibular and sublingual spaces along the floor of mouth.  Periapical lucency surrounding the left second mandibular molar is worsened and suspicious for worsening periapical abscess.         Results from last 7 days   Lab Units 05/13/24  0444 05/12/24  1016   WBC  Thousand/uL 10.11 10.78*   HEMOGLOBIN g/dL 9.9* 11.7   HEMATOCRIT % 30.0* 34.0*   PLATELETS Thousands/uL 272 312   TOTAL NEUT ABS Thousands/µL 7.20 7.52         Results from last 7 days   Lab Units 05/13/24  0444 05/12/24  1016   SODIUM mmol/L 136 136   POTASSIUM mmol/L 3.6 3.9   CHLORIDE mmol/L 105 104   CO2 mmol/L 24 26   ANION GAP mmol/L 7 6   BUN mg/dL 5 11   CREATININE mg/dL 0.42* 0.50*   EGFR ml/min/1.73sq m 139 132   CALCIUM mg/dL 7.7* 8.7   MAGNESIUM mg/dL 2.1  --      Results from last 7 days   Lab Units 05/13/24  0444   AST U/L 17   ALT U/L 25   ALK PHOS U/L 58   TOTAL PROTEIN g/dL 5.7*   ALBUMIN g/dL 3.3*   TOTAL BILIRUBIN mg/dL 0.73         Results from last 7 days   Lab Units 05/13/24  0444 05/12/24  1016   GLUCOSE RANDOM mg/dL 76 87       No past medical history on file.  Present on Admission:   Dental abscess      Admitting Diagnosis: Mandibular abscess [M27.2]  Age/Sex: 28 y.o. female    Admission Orders:  Scheduled Medications:  ampicillin-sulbactam, 3 g, Intravenous, Q6H  senna, 1 tablet, Oral, Daily      Continuous IV Infusions:  multi-electrolyte, 100 mL/hr, Intravenous, Continuous      PRN Meds:  acetaminophen, 1,000 mg, Intravenous, Q6H PRN  HYDROmorphone, 0.5 mg, Intravenous, Q4H PRN  HYDROmorphone, 0.2 mg, Intravenous, Q2H PRN 5/12 x1  naloxone, 0.04 mg, Intravenous, Q1MIN PRN  ondansetron, 4 mg, Intravenous, Q6H PRN 5/12 x1        IP CONSULT TO CASE MANAGEMENT  IP CONSULT TO ORAL AND MAXILLOFACIAL SURGERY    Network Utilization Review Department  ATTENTION: Please call with any questions or concerns to [phone] and carefully listen to the prompts so that you are directed to the right person. All voicemails are confidential.   For Discharge needs, contact Care Management DC Support Team at [phone] opt. 2  Send all requests for admission clinical reviews, approved or denied determinations and any other requests to dedicated fax number below belonging to the campus where the patient is  receiving treatment. List of dedicated fax numbers for the Facilities:  FACILITY NAME UR FAX NUMBER   ADMISSION DENIALS (Administrative/Medical Necessity) [phone]   DISCHARGE SUPPORT TEAM (NETWORK) [phone]   PARENT CHILD HEALTH (Maternity/NICU/Pediatrics) [phone]   [facility] [phone]   [facility] [phone]   [facility] [phone]   [facility] [phone]   [facility] [phone]   [facility] [phone]   [facility] [phone]   [facility] [phone]   [facility] [phone]   [facility] [phone]   [facility] [phone]   [facility] [phone]          home

## 2024-05-14 NOTE — PLAN OF CARE
Problem: INFECTION - ADULT  Goal: Absence or prevention of progression during hospitalization  Description: INTERVENTIONS:  - Assess and monitor for signs and symptoms of infection  - Monitor lab/diagnostic results  - Monitor all insertion sites, i.e. indwelling lines, tubes, and drains  - Monitor endotracheal if appropriate and nasal secretions for changes in amount and color  - [place] appropriate cooling/warming therapies per order  - Administer medications as ordered  - Instruct and encourage patient and family to use good hand hygiene technique  - Identify and instruct in appropriate isolation precautions for identified infection/condition  Outcome: Progressing     Problem: PAIN - ADULT  Goal: Verbalizes/displays adequate comfort level or baseline comfort level  Description: Interventions:  - Encourage patient to monitor pain and request assistance  - Assess pain using appropriate pain scale  - Administer analgesics based on type and severity of pain and evaluate response  - Implement non-pharmacological measures as appropriate and evaluate response  - Consider cultural and social influences on pain and pain management  - Notify physician/advanced practitioner if interventions unsuccessful or patient reports new pain  Outcome: Progressing

## 2024-05-14 NOTE — ANESTHESIA POSTPROCEDURE EVALUATION
Post-Op Assessment Note    CV Status:  Stable  Pain Score: 6    Pain management: inadequate       Mental Status:  Alert and awake   Hydration Status:  Euvolemic   PONV Controlled:  Controlled   Airway Patency:  Patent     Post Op Vitals Reviewed: Yes    No anethesia notable event occurred.    Staff: CRNA               /70 (05/14/24 1638)    Temp 99.3 °F (37.4 °C) (05/14/24 1638)    Pulse (!) 111 (05/14/24 1638)   Resp 15 (05/14/24 1638)    SpO2 100 % (05/14/24 1638)

## 2024-05-14 NOTE — ANESTHESIA PREPROCEDURE EVALUATION
Procedure:  INCISION AND DRAINAGE  (I&D) WOUND ORAL (Left: Face)  EXTRACTION TEETH MULTIPLE (Mouth)    Relevant Problems   Digestive   (+) Dental abscess      Unable to open mouth due to pain  Physical Exam    Airway  Comment: Unable to open mouth    TM Distance: <3 FB  Neck ROM: limited     Dental       Cardiovascular      Pulmonary      Other Findings  post-pubertal.      Anesthesia Plan  ASA Score- 3     Anesthesia Type- general with ASA Monitors.         Additional Monitors:     Airway Plan:            Plan Factors-Exercise tolerance (METS): >4 METS.    Chart reviewed.   Existing labs reviewed. Patient summary reviewed.    Patient is not a current smoker.              Induction- intravenous.    Postoperative Plan-     Informed Consent- Anesthetic plan and risks discussed with patient.  I personally reviewed this patient with the CRNA. Discussed and agreed on the Anesthesia Plan with the CRNA..            Lab Results   Component Value Date    GLUC 76 05/13/2024    ALT 25 05/13/2024    AST 17 05/13/2024    BUN 5 05/13/2024    CALCIUM 7.7 (L) 05/13/2024     05/13/2024    CO2 24 05/13/2024    CREATININE 0.42 (L) 05/13/2024    HCT 31.9 (L) 05/14/2024    HGB 10.6 (L) 05/14/2024    MG 2.1 05/13/2024     05/14/2024    K 3.6 05/13/2024    WBC 11.19 (H) 05/14/2024

## 2024-05-15 PROCEDURE — 99232 SBSQ HOSP IP/OBS MODERATE 35: CPT | Performed by: INTERNAL MEDICINE

## 2024-05-15 PROCEDURE — 99024 POSTOP FOLLOW-UP VISIT: CPT | Performed by: DENTIST

## 2024-05-15 RX ORDER — IBUPROFEN 400 MG/1
800 TABLET ORAL EVERY 6 HOURS PRN
Status: DISCONTINUED | OUTPATIENT
Start: 2024-05-15 | End: 2024-05-15

## 2024-05-15 RX ORDER — ACETAMINOPHEN 160 MG/5ML
650 SUSPENSION ORAL EVERY 4 HOURS PRN
Status: DISCONTINUED | OUTPATIENT
Start: 2024-05-15 | End: 2024-05-15

## 2024-05-15 RX ORDER — IBUPROFEN 600 MG/1
600 TABLET ORAL EVERY 6 HOURS PRN
Status: DISCONTINUED | OUTPATIENT
Start: 2024-05-15 | End: 2024-05-16 | Stop reason: HOSPADM

## 2024-05-15 RX ORDER — HYDROMORPHONE HCL IN WATER/PF 6 MG/30 ML
0.2 PATIENT CONTROLLED ANALGESIA SYRINGE INTRAVENOUS EVERY 6 HOURS PRN
Status: DISCONTINUED | OUTPATIENT
Start: 2024-05-15 | End: 2024-05-16 | Stop reason: HOSPADM

## 2024-05-15 RX ORDER — IBUPROFEN 600 MG/1
600 TABLET ORAL EVERY 6 HOURS PRN
Status: DISCONTINUED | OUTPATIENT
Start: 2024-05-15 | End: 2024-05-15

## 2024-05-15 RX ORDER — OXYCODONE HYDROCHLORIDE 5 MG/1
5 TABLET ORAL EVERY 6 HOURS PRN
Status: DISCONTINUED | OUTPATIENT
Start: 2024-05-15 | End: 2024-05-16 | Stop reason: HOSPADM

## 2024-05-15 RX ORDER — HYDROMORPHONE HCL IN WATER/PF 6 MG/30 ML
0.2 PATIENT CONTROLLED ANALGESIA SYRINGE INTRAVENOUS EVERY 4 HOURS PRN
Status: DISCONTINUED | OUTPATIENT
Start: 2024-05-15 | End: 2024-05-15

## 2024-05-15 RX ORDER — OXYCODONE HYDROCHLORIDE 5 MG/1
5 TABLET ORAL EVERY 4 HOURS PRN
Status: DISCONTINUED | OUTPATIENT
Start: 2024-05-15 | End: 2024-05-15

## 2024-05-15 RX ADMIN — SENNOSIDES 8.6 MG: 8.6 TABLET, FILM COATED ORAL at 09:32

## 2024-05-15 RX ADMIN — OXYCODONE HYDROCHLORIDE 5 MG: 5 TABLET ORAL at 11:27

## 2024-05-15 RX ADMIN — SODIUM CHLORIDE 3 G: 9 INJECTION, SOLUTION INTRAVENOUS at 23:33

## 2024-05-15 RX ADMIN — HYDROMORPHONE HYDROCHLORIDE 0.2 MG: 0.2 INJECTION, SOLUTION INTRAMUSCULAR; INTRAVENOUS; SUBCUTANEOUS at 12:38

## 2024-05-15 RX ADMIN — HYDROMORPHONE HYDROCHLORIDE 0.2 MG: 0.2 INJECTION, SOLUTION INTRAMUSCULAR; INTRAVENOUS; SUBCUTANEOUS at 05:00

## 2024-05-15 RX ADMIN — SODIUM CHLORIDE 3 G: 9 INJECTION, SOLUTION INTRAVENOUS at 05:02

## 2024-05-15 RX ADMIN — SODIUM CHLORIDE 3 G: 9 INJECTION, SOLUTION INTRAVENOUS at 16:51

## 2024-05-15 RX ADMIN — IBUPROFEN 600 MG: 600 TABLET, FILM COATED ORAL at 16:58

## 2024-05-15 RX ADMIN — HYDROMORPHONE HYDROCHLORIDE 0.2 MG: 0.2 INJECTION, SOLUTION INTRAMUSCULAR; INTRAVENOUS; SUBCUTANEOUS at 18:32

## 2024-05-15 RX ADMIN — HYDROMORPHONE HYDROCHLORIDE 0.2 MG: 0.2 INJECTION, SOLUTION INTRAMUSCULAR; INTRAVENOUS; SUBCUTANEOUS at 08:58

## 2024-05-15 RX ADMIN — SODIUM CHLORIDE 3 G: 9 INJECTION, SOLUTION INTRAVENOUS at 11:56

## 2024-05-15 NOTE — PROGRESS NOTES
Progress Note -  Abdon Su 28 y.o. female MRN: [id number]  Unit/Bed#: MS 46[facility] Encounter: [id number]      HPI/24hr events: Pt currently POD  1 from I&D head and neck left with extraction of teeth #'s 17 and 18. Patient doing well, tolerating PO. Patient feels her swelling and opening is improving the morning    Vitals:   Vitals:    05/14/24 2334 05/14/24 2335 05/15/24 0242 05/15/24 0734   BP: 101/59 101/59 110/65 109/66   BP Location:  Right arm     Pulse:  76 88 83   Resp:   19 16   Temp:  98.8 °F (37.1 °C) 98.2 °F (36.8 °C) 99.1 °F (37.3 °C)   TempSrc:  Oral     SpO2:  97% 96% 98%   Weight:       Height:         Temp  Min: 97.7 °F (36.5 °C)  Max: 99.8 °F (37.7 °C)  IBW (Ideal Body Weight): 52.4 kg  Body mass index is 22.5 kg/m².        Physical Exam:    Constitutional: Appears well-developed and well-nourished.  Neuro: A&Ox 3, NAD. CNV2-3 and VII grossly intact b/l    Pulm: Sating well on RA, speaking in full sentences, Lungs CTA b/l  No wheezes or rales.   CVS: Normal rate, regular rhythm and normal heart sounds.  Exam reveals no friction rub.  No murmur heard.  HENT: Normocephalic and atraumatic.   Neck- Slightly indurated left submandibular space drain x 1 in place draining SS discharge. Supple and soft.  No cervical lymphadenopathy, trachea midline.  Oral: LYNN 35mm. Lips and mucosal surfaces wnl, floor of mouth is soft with no palpable masses,    Extraction sockets hemostatic, no purulent discharge , lateral excursive movements wnl,    Incisions hemostatic. Sutures intact      Imaging: I have personally reviewed pertinent reports.        Medications:   Scheduled Meds:  Current Facility-Administered Medications   Medication Dose Route Frequency Provider Last Rate    acetaminophen  1,000 mg Intravenous Q6H PRN Nikki Velasco MD 1,000 mg (05/14/24 0908)    ampicillin-sulbactam  3 g Intravenous Q6H Nikki Velasco MD 3 g (05/15/24 2947)    HYDROmorphone  0.5 mg Intravenous Q4H PRN Nikki Velasco MD       HYDROmorphone  0.2 mg Intravenous Q2H PRN Nikki Velasco MD      multi-electrolyte  100 mL/hr Intravenous Continuous Nikki Velasco  mL/hr (05/14/24 2329)    naloxone  0.04 mg Intravenous Q1MIN PRN Nikki Velasco MD      ondansetron  4 mg Intravenous Q6H PRN Nikki Velasco MD      senna  1 tablet Oral Daily Nikki Velasco MD       Continuous Infusions:multi-electrolyte, 100 mL/hr, Last Rate: 100 mL/hr (05/14/24 2329)      PRN Meds:    acetaminophen    HYDROmorphone    HYDROmorphone    naloxone    ondansetron    Impression/Plan:  28F is s/p incision and drainage/ extraction of teet 17&18, POD1. Pt is doing well, c/o minimal pain and swelling. Pt is isTolerating PO, ambulating, and voiding appropriately. Patient would benefit from one more day of IV abx  Afebrile, no Leukocytosis, VSS.     - Unasyn 3g q6h- transition to Augmentin 875mg BID on discharge for 7 day course   - Encourage use of Aqua K pad for warm compress to face, and jaw PT to improve opening   -Encourage ambulation, and PO intake   - mIVF until PO intake is adequate   -Dressing changes prn/when saturated, 4x4 with medpore tape   -Warm salt water rinses  -Maintain good oral hygiene, brush teeth BID  -Drains likely to be removed tomorrow  -OMFS to follow

## 2024-05-15 NOTE — RESTORATIVE TECHNICIAN NOTE
Restorative Technician Note      Patient Name: Abdon Su     Restorative Tech Visit Date: 05/15/24  Note Type: Mobility  Patient Position Upon Consult: Supine  Activity Performed: Ambulated  Assistive Device: Other (Comment) (None)  Patient Position at End of Consult: Supine; All needs within reach  Nurse Communication: Nurse aware of consult, application of brace    Javy Dumont, Restorative Technician

## 2024-05-15 NOTE — PLAN OF CARE
Problem: PAIN - ADULT  Goal: Verbalizes/displays adequate comfort level or baseline comfort level  Description: Interventions:  - Encourage patient to monitor pain and request assistance  - Assess pain using appropriate pain scale  - Administer analgesics based on type and severity of pain and evaluate response  - Implement non-pharmacological measures as appropriate and evaluate response  - Consider cultural and social influences on pain and pain management  - Notify physician/advanced practitioner if interventions unsuccessful or patient reports new pain  Outcome: Progressing     Problem: INFECTION - ADULT  Goal: Absence or prevention of progression during hospitalization  Description: INTERVENTIONS:  - Assess and monitor for signs and symptoms of infection  - Monitor lab/diagnostic results  - Monitor all insertion sites, i.e. indwelling lines, tubes, and drains  - Monitor endotracheal if appropriate and nasal secretions for changes in amount and color  - [place] appropriate cooling/warming therapies per order  - Administer medications as ordered  - Instruct and encourage patient and family to use good hand hygiene technique  - Identify and instruct in appropriate isolation precautions for identified infection/condition  Outcome: Progressing  Goal: Absence of fever/infection during neutropenic period  Description: INTERVENTIONS:  - Monitor WBC    Outcome: Progressing

## 2024-05-15 NOTE — PROGRESS NOTES
INTERNAL MEDICINE RESIDENCY PROGRESS NOTE     Name: Abdon Su   Age & Sex: 28 y.o. female   MRN: [id number]  Unit/Bed#: -João   Encounter: [id number]  Team: SOD Team A    PATIENT INFORMATION     Name: Abdon Su   Age & Sex: 28 y.o. female   MRN: [id number]  Hospital Stay Days: 3    ASSESSMENT/PLAN     Principal Problem:    Dental abscess      * Dental abscess  Assessment & Plan  Presented with 2 day h/o pain at the left mandibular area. Afebrile but unable to open her mouth, unable to chew..  No facial swelling.   Patient keep taking naproxen with no help. Patient denies sore throat.     At the left mandibular area there is an area of swelling which is 1 x 1 inches the area is very tender to touch. The swelling is firm in consistency not mobile. Patient cannot open her mouth. Patient has no tenderness on the teeth. Patient has no swollen gums. There is no cervical lymphadenopathy. S/p unasyn x 1 dose    Plan  - Transferred from [place] to [place] pending [facility] intervention  - S/p I&D with OMFS 5/14 with 1 x penrose drain   - Drain to be removed tomorrow per OMFS and then dc  - Warm compress  - Pain control:   - Previously on IV abx, will introduce PO opioids  - C/w Unasyn 3g Q6 until dc, then transition to PO augmentin        Disposition: inpatient manage for penrose drain. On iv abx     SUBJECTIVE     Patient seen and examined. No acute events overnight. Endorsing ongoing pain around the mandible. Has bandage in place with penrose drain. Ongoing drainage present. Able to open her mouth more; without odynophagia. No fever, chills, nausea or vomiting. Tolerating PO intake. Has not had BM in 3 days d/t low appetite and PO intake. She understands that opioids can slow GI motility and may ask for stool softener. Otherwise, no other complaints. Other ROS negative.     OBJECTIVE     Vitals:    05/14/24 2334 05/14/24 2335 05/15/24 0242 05/15/24 0734   BP: 101/59 101/59 110/65 109/66   BP  Location:  Right arm     Pulse:  76 88 83   Resp:   19 16   Temp:  98.8 °F (37.1 °C) 98.2 °F (36.8 °C) 99.1 °F (37.3 °C)   TempSrc:  Oral     SpO2:  97% 96% 98%   Weight:       Height:          Temperature:   Temp (24hrs), Av °F (37.2 °C), Min:98.2 °F (36.8 °C), Max:99.8 °F (37.7 °C)    Temperature: 99.1 °F (37.3 °C)  Intake & Output:  I/O         / 0701  / 0700 / 0701  05/15 0700 /15 0701  / 0700    P.O.       I.V. (mL/kg) 1030 (17.9) 2095 (36.4)     IV Piggyback 200 100     Total Intake(mL/kg) 1230 (21.4) 2195 (38.1)     Urine (mL/kg/hr)  0 (0)     Emesis/NG output       Drains  0     Stool  0     Total Output  0     Net +1230 +2195            Unmeasured Urine Occurrence 1 x 2 x     Unmeasured Stool Occurrence  0 x           Weights:   IBW (Ideal Body Weight): 52.4 kg    Body mass index is 22.5 kg/m².  Weight (last 2 days)       None          Physical Exam  Vitals and nursing note reviewed.   Constitutional:       General: She is not in acute distress.     Appearance: She is well-developed.   HENT:      Head: Normocephalic and atraumatic.      Mouth/Throat:      Comments: Improved ability to open mouth, limited by pain  1 x penrose drain in place; draining serosanguineous fluids  Surrounding area expected erythema and tenderness, but improving swelling  Eyes:      Conjunctiva/sclera: Conjunctivae normal.   Cardiovascular:      Rate and Rhythm: Normal rate and regular rhythm.      Heart sounds: No murmur heard.  Pulmonary:      Effort: Pulmonary effort is normal. No respiratory distress.      Breath sounds: Normal breath sounds.   Abdominal:      Palpations: Abdomen is soft.      Tenderness: There is no abdominal tenderness.   Musculoskeletal:         General: No swelling.      Cervical back: Neck supple.   Skin:     General: Skin is warm and dry.      Capillary Refill: Capillary refill takes less than 2 seconds.   Neurological:      Mental Status: She is alert and oriented to person, place,  and time.   Psychiatric:         Mood and Affect: Mood normal.       LABORATORY DATA     Labs: I have personally reviewed pertinent reports.  Results from last 7 days   Lab Units 05/14/24  0511 05/13/24  0444 05/12/24  1016   WBC Thousand/uL 11.19* 10.11 10.78*   HEMOGLOBIN g/dL 10.6* 9.9* 11.7   HEMATOCRIT % 31.9* 30.0* 34.0*   PLATELETS Thousands/uL 335 272 312   SEGS PCT % 76* 71 70   MONO PCT % 10 9 8   EOS PCT % 3 3 4      Results from last 7 days   Lab Units 05/13/24  0444 05/12/24  1016   POTASSIUM mmol/L 3.6 3.9   CHLORIDE mmol/L 105 104   CO2 mmol/L 24 26   BUN mg/dL 5 11   CREATININE mg/dL 0.42* 0.50*   CALCIUM mg/dL 7.7* 8.7   ALK PHOS U/L 58  --    ALT U/L 25  --    AST U/L 17  --      Results from last 7 days   Lab Units 05/13/24  0444   MAGNESIUM mg/dL 2.1                        IMAGING & DIAGNOSTIC TESTING     Radiology Results: I have personally reviewed pertinent reports.  CT soft tissue neck with contrast    Result Date: 5/12/2024  Impression: Redemonstration of periodontal disease and periapical lucency involving the left third mandibular molar, with new breach of the lingual cortex since the prior examination and consistent with infection/periapical abscess. Inflammatory change involving the  space with ill-defined low-attenuation marginal enhancement along the lingual aspect of the posterior left mandible is consistent with phlegmon/developing abscess formation. Edema extends into the left submandibular and sublingual spaces along the floor of mouth. Periapical lucency surrounding the left second mandibular molar is worsened and suspicious for worsening periapical abscess. Workstation performed: [id number]     Other Diagnostic Testing: I have personally reviewed pertinent reports.    ACTIVE MEDICATIONS     Current Facility-Administered Medications   Medication Dose Route Frequency    acetaminophen (TYLENOL) oral suspension 650 mg  650 mg Oral Q4H PRN    ampicillin-sulbactam (UNASYN) 3  "g in sodium chloride 0.9 % 100 mL IVPB  3 g Intravenous Q6H    HYDROmorphone HCl (DILAUDID) injection 0.2 mg  0.2 mg Intravenous Q6H PRN    multi-electrolyte (PLASMALYTE-A/ISOLYTE-S PH 7.4) IV solution  100 mL/hr Intravenous Continuous    naloxone (NARCAN) 0.04 mg/mL syringe 0.04 mg  0.04 mg Intravenous Q1MIN PRN    ondansetron (ZOFRAN) injection 4 mg  4 mg Intravenous Q6H PRN    oxyCODONE (ROXICODONE) split tablet 2.5 mg  2.5 mg Oral Q4H PRN    Or    oxyCODONE (ROXICODONE) IR tablet 5 mg  5 mg Oral Q4H PRN    senna (SENOKOT) tablet 8.6 mg  1 tablet Oral Daily       VTE Pharmacologic Prophylaxis: Reason for no pharmacologic prophylaxis low risk  VTE Mechanical Prophylaxis: reason for no mechanical VTE prophylaxis low risk    Portions of the record may have been created with voice recognition software.  Occasional wrong word or \"sound a like\" substitutions may have occurred due to the inherent limitations of voice recognition software.  Read the chart carefully and recognize, using context, where substitutions have occurred.  ==  Devonte Santana MD  [facility]  Internal Medicine Residency PGY-1      "

## 2024-05-16 VITALS
RESPIRATION RATE: 15 BRPM | OXYGEN SATURATION: 96 % | DIASTOLIC BLOOD PRESSURE: 54 MMHG | TEMPERATURE: 96.6 F | WEIGHT: 127 LBS | HEART RATE: 90 BPM | HEIGHT: 63 IN | BODY MASS INDEX: 22.5 KG/M2 | SYSTOLIC BLOOD PRESSURE: 94 MMHG

## 2024-05-16 LAB
BACTERIA SPEC ANAEROBE CULT: NORMAL
BACTERIA WND AEROBE CULT: NORMAL
BASOPHILS # BLD AUTO: 0.04 THOUSANDS/ÂΜL (ref 0–0.1)
BASOPHILS NFR BLD AUTO: 1 % (ref 0–1)
EOSINOPHIL # BLD AUTO: 0.07 THOUSAND/ÂΜL (ref 0–0.61)
EOSINOPHIL NFR BLD AUTO: 1 % (ref 0–6)
ERYTHROCYTE [DISTWIDTH] IN BLOOD BY AUTOMATED COUNT: 12.2 % (ref 11.6–15.1)
GRAM STN SPEC: NORMAL
GRAM STN SPEC: NORMAL
HCT VFR BLD AUTO: 29.5 % (ref 34.8–46.1)
HGB BLD-MCNC: 9.8 G/DL (ref 11.5–15.4)
IMM GRANULOCYTES # BLD AUTO: 0.04 THOUSAND/UL (ref 0–0.2)
IMM GRANULOCYTES NFR BLD AUTO: 1 % (ref 0–2)
LYMPHOCYTES # BLD AUTO: 2.32 THOUSANDS/ÂΜL (ref 0.6–4.47)
LYMPHOCYTES NFR BLD AUTO: 31 % (ref 14–44)
MCH RBC QN AUTO: 31.9 PG (ref 26.8–34.3)
MCHC RBC AUTO-ENTMCNC: 33.2 G/DL (ref 31.4–37.4)
MCV RBC AUTO: 96 FL (ref 82–98)
MONOCYTES # BLD AUTO: 0.56 THOUSAND/ÂΜL (ref 0.17–1.22)
MONOCYTES NFR BLD AUTO: 7 % (ref 4–12)
NEUTROPHILS # BLD AUTO: 4.49 THOUSANDS/ÂΜL (ref 1.85–7.62)
NEUTS SEG NFR BLD AUTO: 59 % (ref 43–75)
NRBC BLD AUTO-RTO: 0 /100 WBCS
PLATELET # BLD AUTO: 364 THOUSANDS/UL (ref 149–390)
PMV BLD AUTO: 9.2 FL (ref 8.9–12.7)
RBC # BLD AUTO: 3.07 MILLION/UL (ref 3.81–5.12)
WBC # BLD AUTO: 7.52 THOUSAND/UL (ref 4.31–10.16)

## 2024-05-16 PROCEDURE — 85025 COMPLETE CBC W/AUTO DIFF WBC: CPT

## 2024-05-16 RX ORDER — IBUPROFEN 600 MG/1
600 TABLET ORAL EVERY 6 HOURS PRN
Qty: 30 TABLET | Refills: 0 | Status: SHIPPED | OUTPATIENT
Start: 2024-05-16

## 2024-05-16 RX ORDER — AMOXICILLIN AND CLAVULANATE POTASSIUM 875; 125 MG/1; MG/1
1 TABLET, FILM COATED ORAL EVERY 12 HOURS SCHEDULED
Qty: 21 TABLET | Refills: 0 | Status: SHIPPED | OUTPATIENT
Start: 2024-05-16 | End: 2024-05-27

## 2024-05-16 RX ORDER — AMOXICILLIN AND CLAVULANATE POTASSIUM 875; 125 MG/1; MG/1
1 TABLET, FILM COATED ORAL EVERY 12 HOURS SCHEDULED
Status: DISCONTINUED | OUTPATIENT
Start: 2024-05-16 | End: 2024-05-16 | Stop reason: HOSPADM

## 2024-05-16 RX ADMIN — AMOXICILLIN AND CLAVULANATE POTASSIUM 1 TABLET: 875; 125 TABLET, FILM COATED ORAL at 11:16

## 2024-05-16 RX ADMIN — HYDROMORPHONE HYDROCHLORIDE 0.2 MG: 0.2 INJECTION, SOLUTION INTRAMUSCULAR; INTRAVENOUS; SUBCUTANEOUS at 08:02

## 2024-05-16 RX ADMIN — SENNOSIDES 8.6 MG: 8.6 TABLET, FILM COATED ORAL at 08:02

## 2024-05-16 RX ADMIN — SODIUM CHLORIDE 3 G: 9 INJECTION, SOLUTION INTRAVENOUS at 04:44

## 2024-05-16 RX ADMIN — OXYCODONE HYDROCHLORIDE 5 MG: 5 TABLET ORAL at 05:52

## 2024-05-16 NOTE — CASE MANAGEMENT
Case Management Discharge Planning Note    Patient name Abdon Su  Location /-01 MRN [id number]  : 1995 Date 2024       Current Admission Date: 2024  Current Admission Diagnosis:Dental abscess   Patient Active Problem List    Diagnosis Date Noted Date Diagnosed    Dental abscess 2024       LOS (days): 4  Geometric Mean LOS (GMLOS) (days): 3.7  Days to GMLOS:0.1     OBJECTIVE:  Risk of Unplanned Readmission Score: 5.18         Current admission status: Inpatient   Preferred Pharmacy:   [id number] [place] - [address]  [address]  [place] [address]  Phone: [phone] Fax: [phone]    Primary Care Provider: No primary care provider on file.    Primary Insurance: [place] PENDING  Secondary Insurance:     DISCHARGE DETAILS:             Other Referral/Resources/Interventions Provided:  Referral Comments: Spoke to pt's bedside nurse, drain pulled. Pt's brother in room w/pt who can trasnport home upon dc.

## 2024-05-16 NOTE — QUICK NOTE
Progress Note -  Abdon Su 28 y.o. female MRN: [id number]  Unit/Bed#: -João Encounter: [id number]      HPI/24hr events: Pt currently POD  2 from I&D head and neck left with extraction of teeth #'s 17 and 18. Patient doing well, tolerating PO. Patient feels her swelling and opening is improving. Drain removed on rounds this morning    Vitals:   Vitals:    05/15/24 1431 05/15/24 1924 05/15/24 2238 05/16/24 0721   BP: (!) 86/52 101/54 102/63 94/54   Pulse: 85 77 76 90   Resp: 17 19 21 15   Temp: 98.5 °F (36.9 °C) 98.7 °F (37.1 °C) 97.6 °F (36.4 °C) (!) 96.6 °F (35.9 °C)   TempSrc:       SpO2: 93% 99% 98% 96%   Weight:       Height:         Temp  Min: 96.6 °F (35.9 °C)  Max: 99.8 °F (37.7 °C)  IBW (Ideal Body Weight): 52.4 kg  Body mass index is 22.5 kg/m².        Physical Exam:    Constitutional: Appears well-developed and well-nourished.  Neuro: A&Ox 3, NAD. CNV2-3 and VII grossly intact b/l    Pulm: Sating well on RA, speaking in full sentences, Lungs CTA b/l  No wheezes or rales.   CVS: Normal rate, regular rhythm and normal heart sounds.  Exam reveals no friction rub.  No murmur heard.  HENT: Normocephalic and atraumatic.   Neck- Slightly indurated left submandibular space drain x 1 in place draining SS discharge. Supple and soft.  No cervical lymphadenopathy, trachea midline.  Oral: LYNN 20mm. Lips and mucosal surfaces wnl, floor of mouth is soft with no palpable masses,    Extraction sockets hemostatic, no purulent discharge , lateral excursive movements wnl,    Incisions hemostatic. Sutures intact      Imaging: I have personally reviewed pertinent reports.        Medications:   Scheduled Meds:  Current Facility-Administered Medications   Medication Dose Route Frequency Provider Last Rate    ampicillin-sulbactam  3 g Intravenous Q6H Nikki Velasco MD Stopped (05/16/24 0536)    HYDROmorphone  0.2 mg Intravenous Q6H PRN Devonte Santana MD      ibuprofen  600 mg Oral Q6H PRN Devonte Santana MD       multi-electrolyte  100 mL/hr Intravenous Continuous Nikki Velasco MD Stopped (05/15/24 0853)    naloxone  0.04 mg Intravenous Q1MIN PRN Nikki Velasco MD      ondansetron  4 mg Intravenous Q6H PRN Nikki Velasco MD      oxyCODONE  5 mg Oral Q6H PRN Thomas Neumann MD      senna  1 tablet Oral Daily Nikki Velasco MD       Continuous Infusions:multi-electrolyte, 100 mL/hr, Last Rate: Stopped (05/15/24 0853)      PRN Meds:    HYDROmorphone    ibuprofen    naloxone    ondansetron    oxyCODONE    Impression/Plan:  28F is s/p incision and drainage/ extraction of teet 17&18, POD. Pt is doing well, c/o minimal pain and swelling. Pt is isTolerating PO, ambulating, and voiding appropriately. Drain removed on rounds this morning. Cleared for DC from OMFs perspective  Afebrile, no Leukocytosis, VSS.      - Augmentin 875mg BID on discharge for 14 day course   - Encourage use of Aqua K pad for warm compress to face, and jaw PT to improve opening   -Encourage ambulation, and PO intake    -Dressing changes prn/when saturated, 4x4 with medpore tape   -Warm salt water rinses  -Maintain good oral hygiene, brush teeth BID  -Drains likely to be removed tomorrow  -F/u in one week at [facility]

## 2024-05-16 NOTE — DISCHARGE INSTR - AVS FIRST PAGE
Please follow-up / make appointments with the following physicians:   [facility] in about 2-4 weeks after discharge for check-up of dental abscess  [facility] in order to establish with a primary care physician (PCP) within 1-2 weeks for post-hospitalization check-up  Please START the following medication  Augmentin 875 mg tabs ---> Take 1 tablet two times per day for the next 10 days. Please complete this course of antibiotics regardless of symptoms improving or not.

## 2024-05-16 NOTE — PLAN OF CARE
Problem: PAIN - ADULT  Goal: Verbalizes/displays adequate comfort level or baseline comfort level  Description: Interventions:  - Encourage patient to monitor pain and request assistance  - Assess pain using appropriate pain scale  - Administer analgesics based on type and severity of pain and evaluate response  - Implement non-pharmacological measures as appropriate and evaluate response  - Consider cultural and social influences on pain and pain management  - Notify physician/advanced practitioner if interventions unsuccessful or patient reports new pain  Outcome: Progressing     Problem: INFECTION - ADULT  Goal: Absence or prevention of progression during hospitalization  Description: INTERVENTIONS:  - Assess and monitor for signs and symptoms of infection  - Monitor lab/diagnostic results  - Monitor all insertion sites, i.e. indwelling lines, tubes, and drains  - Monitor endotracheal if appropriate and nasal secretions for changes in amount and color  - [place] appropriate cooling/warming therapies per order  - Administer medications as ordered  - Instruct and encourage patient and family to use good hand hygiene technique  - Identify and instruct in appropriate isolation precautions for identified infection/condition  Outcome: Progressing  Goal: Absence of fever/infection during neutropenic period  Description: INTERVENTIONS:  - Monitor WBC    Outcome: Progressing     Problem: DISCHARGE PLANNING  Goal: Discharge to home or other facility with appropriate resources  Description: INTERVENTIONS:  - Identify barriers to discharge w/patient and caregiver  - Arrange for needed discharge resources and transportation as appropriate  - Identify discharge learning needs (meds, wound care, etc.)  - Arrange for interpretive services to assist at discharge as needed  - Refer to Case Management Department for coordinating discharge planning if the patient needs post-hospital services based on physician/advanced  practitioner order or complex needs related to functional status, cognitive ability, or social support system  Outcome: Progressing     Problem: Knowledge Deficit  Goal: Patient/family/caregiver demonstrates understanding of disease process, treatment plan, medications, and discharge instructions  Description: Complete learning assessment and assess knowledge base.  Interventions:  - Provide teaching at level of understanding  - Provide teaching via preferred learning methods  Outcome: Progressing

## (undated) DEVICE — STERILE MANDIBLE PACK: Brand: CARDINAL HEALTH

## (undated) DEVICE — ALL PURPOSE SPONGES,NONWOVEN, 4 PLY: Brand: CURITY

## (undated) DEVICE — 3000CC GUARDIAN II: Brand: GUARDIAN

## (undated) DEVICE — SYRINGE 10ML LL

## (undated) DEVICE — NEEDLE 25G X 1 1/2

## (undated) DEVICE — GLOVE SRG BIOGEL 7.5